# Patient Record
Sex: FEMALE | Race: WHITE | Employment: FULL TIME | URBAN - METROPOLITAN AREA
[De-identification: names, ages, dates, MRNs, and addresses within clinical notes are randomized per-mention and may not be internally consistent; named-entity substitution may affect disease eponyms.]

---

## 2017-01-10 ENCOUNTER — OFFICE VISIT (OUTPATIENT)
Dept: FAMILY MEDICINE CLINIC | Age: 32
End: 2017-01-10

## 2017-01-10 VITALS
HEART RATE: 70 BPM | OXYGEN SATURATION: 100 % | DIASTOLIC BLOOD PRESSURE: 60 MMHG | SYSTOLIC BLOOD PRESSURE: 100 MMHG | TEMPERATURE: 97.6 F | WEIGHT: 128.2 LBS | BODY MASS INDEX: 24.22 KG/M2

## 2017-01-10 DIAGNOSIS — M79.641 PAIN OF RIGHT HAND: Primary | ICD-10-CM

## 2017-01-10 PROCEDURE — 99213 OFFICE O/P EST LOW 20 MIN: CPT | Performed by: FAMILY MEDICINE

## 2017-01-10 RX ORDER — MELOXICAM 15 MG/1
15 TABLET ORAL DAILY
Qty: 30 TABLET | Refills: 3 | Status: SHIPPED | OUTPATIENT
Start: 2017-01-10 | End: 2017-05-22

## 2017-02-20 ENCOUNTER — NURSE ONLY (OUTPATIENT)
Dept: FAMILY MEDICINE CLINIC | Age: 32
End: 2017-02-20

## 2017-02-20 ENCOUNTER — TELEPHONE (OUTPATIENT)
Dept: FAMILY MEDICINE CLINIC | Age: 32
End: 2017-02-20

## 2017-02-20 DIAGNOSIS — R39.15 URGENCY OF URINATION: Primary | ICD-10-CM

## 2017-02-20 DIAGNOSIS — N39.0 URINARY TRACT INFECTION WITHOUT HEMATURIA, SITE UNSPECIFIED: Primary | ICD-10-CM

## 2017-02-20 LAB
BILIRUBIN, POC: ABNORMAL
BLOOD URINE, POC: ABNORMAL
CLARITY, POC: CLEAR
COLOR, POC: ABNORMAL
GLUCOSE URINE, POC: ABNORMAL
KETONES, POC: ABNORMAL
LEUKOCYTE EST, POC: ABNORMAL
NITRITE, POC: POSITIVE
PH, POC: 6
PROTEIN, POC: ABNORMAL
SPECIFIC GRAVITY, POC: 1.02
UROBILINOGEN, POC: 0.2

## 2017-02-20 PROCEDURE — 81002 URINALYSIS NONAUTO W/O SCOPE: CPT | Performed by: FAMILY MEDICINE

## 2017-02-20 PROCEDURE — 81003 URINALYSIS AUTO W/O SCOPE: CPT | Performed by: FAMILY MEDICINE

## 2017-02-20 RX ORDER — NITROFURANTOIN 25; 75 MG/1; MG/1
100 CAPSULE ORAL 2 TIMES DAILY
Qty: 20 CAPSULE | Refills: 0 | Status: SHIPPED | OUTPATIENT
Start: 2017-02-20 | End: 2017-03-02

## 2017-02-22 LAB
ORGANISM: ABNORMAL
URINE CULTURE, ROUTINE: ABNORMAL

## 2017-03-20 ENCOUNTER — OFFICE VISIT (OUTPATIENT)
Dept: FAMILY MEDICINE CLINIC | Age: 32
End: 2017-03-20

## 2017-03-20 VITALS
DIASTOLIC BLOOD PRESSURE: 64 MMHG | HEIGHT: 62 IN | WEIGHT: 131 LBS | BODY MASS INDEX: 24.11 KG/M2 | SYSTOLIC BLOOD PRESSURE: 100 MMHG

## 2017-03-20 DIAGNOSIS — Z00.00 REGULAR CHECK-UP: Primary | ICD-10-CM

## 2017-03-20 LAB
ANION GAP SERPL CALCULATED.3IONS-SCNC: 13 MMOL/L (ref 3–16)
BUN BLDV-MCNC: 10 MG/DL (ref 7–20)
CALCIUM SERPL-MCNC: 9.3 MG/DL (ref 8.3–10.6)
CHLORIDE BLD-SCNC: 102 MMOL/L (ref 99–110)
CHOLESTEROL, TOTAL: 180 MG/DL (ref 0–199)
CO2: 25 MMOL/L (ref 21–32)
CREAT SERPL-MCNC: 0.6 MG/DL (ref 0.6–1.1)
GFR AFRICAN AMERICAN: >60
GFR NON-AFRICAN AMERICAN: >60
GLUCOSE BLD-MCNC: 86 MG/DL (ref 70–99)
HDLC SERPL-MCNC: 65 MG/DL (ref 40–60)
LDL CHOLESTEROL CALCULATED: 103 MG/DL
POTASSIUM SERPL-SCNC: 4.9 MMOL/L (ref 3.5–5.1)
SODIUM BLD-SCNC: 140 MMOL/L (ref 136–145)
TRIGL SERPL-MCNC: 58 MG/DL (ref 0–150)
VLDLC SERPL CALC-MCNC: 12 MG/DL

## 2017-03-20 PROCEDURE — 36415 COLL VENOUS BLD VENIPUNCTURE: CPT | Performed by: FAMILY MEDICINE

## 2017-03-20 PROCEDURE — 99395 PREV VISIT EST AGE 18-39: CPT | Performed by: FAMILY MEDICINE

## 2017-03-25 ENCOUNTER — OFFICE VISIT (OUTPATIENT)
Dept: FAMILY MEDICINE CLINIC | Age: 32
End: 2017-03-25

## 2017-03-25 VITALS — HEART RATE: 61 BPM | OXYGEN SATURATION: 100 % | SYSTOLIC BLOOD PRESSURE: 124 MMHG | DIASTOLIC BLOOD PRESSURE: 70 MMHG

## 2017-03-25 DIAGNOSIS — I95.1 ORTHOSTATIC HYPOTENSION: ICD-10-CM

## 2017-03-25 DIAGNOSIS — M54.50 CHRONIC BILATERAL LOW BACK PAIN WITHOUT SCIATICA: Primary | ICD-10-CM

## 2017-03-25 DIAGNOSIS — G89.29 CHRONIC BILATERAL LOW BACK PAIN WITHOUT SCIATICA: Primary | ICD-10-CM

## 2017-03-25 DIAGNOSIS — L72.9 CYST OF SKIN: ICD-10-CM

## 2017-03-25 PROCEDURE — 99213 OFFICE O/P EST LOW 20 MIN: CPT | Performed by: FAMILY MEDICINE

## 2017-04-07 ENCOUNTER — TELEPHONE (OUTPATIENT)
Dept: FAMILY MEDICINE CLINIC | Age: 32
End: 2017-04-07

## 2017-04-07 DIAGNOSIS — R53.83 FATIGUE, UNSPECIFIED TYPE: Primary | ICD-10-CM

## 2017-04-07 DIAGNOSIS — I95.1 ORTHOSTATIC HYPOTENSION: ICD-10-CM

## 2017-04-07 DIAGNOSIS — N92.6 IRREGULAR MENSES: ICD-10-CM

## 2017-04-07 DIAGNOSIS — R42 LIGHTHEADEDNESS: ICD-10-CM

## 2017-04-10 ENCOUNTER — NURSE ONLY (OUTPATIENT)
Dept: FAMILY MEDICINE CLINIC | Age: 32
End: 2017-04-10

## 2017-04-10 ENCOUNTER — TELEPHONE (OUTPATIENT)
Dept: FAMILY MEDICINE CLINIC | Age: 32
End: 2017-04-10

## 2017-04-10 DIAGNOSIS — N92.6 IRREGULAR MENSES: ICD-10-CM

## 2017-04-10 DIAGNOSIS — R42 LIGHTHEADEDNESS: ICD-10-CM

## 2017-04-10 DIAGNOSIS — I95.1 ORTHOSTATIC HYPOTENSION: ICD-10-CM

## 2017-04-10 DIAGNOSIS — R53.83 FATIGUE, UNSPECIFIED TYPE: ICD-10-CM

## 2017-04-10 LAB
ALT SERPL-CCNC: 60 U/L (ref 10–40)
ANION GAP SERPL CALCULATED.3IONS-SCNC: 16 MMOL/L (ref 3–16)
AST SERPL-CCNC: 38 U/L (ref 15–37)
BASOPHILS ABSOLUTE: 0.1 K/UL (ref 0–0.2)
BASOPHILS RELATIVE PERCENT: 1 %
BUN BLDV-MCNC: 10 MG/DL (ref 7–20)
CALCIUM SERPL-MCNC: 9 MG/DL (ref 8.3–10.6)
CHLORIDE BLD-SCNC: 103 MMOL/L (ref 99–110)
CO2: 23 MMOL/L (ref 21–32)
CREAT SERPL-MCNC: 0.6 MG/DL (ref 0.6–1.1)
EOSINOPHILS ABSOLUTE: 0.6 K/UL (ref 0–0.6)
EOSINOPHILS RELATIVE PERCENT: 9.7 %
FOLLICLE STIMULATING HORMONE: 1.7 MIU/ML
GFR AFRICAN AMERICAN: >60
GFR NON-AFRICAN AMERICAN: >60
GLUCOSE BLD-MCNC: 77 MG/DL (ref 70–99)
HCT VFR BLD CALC: 38.1 % (ref 36–48)
HEMOGLOBIN: 12.5 G/DL (ref 12–16)
LUTEINIZING HORMONE: 2.6 MIU/ML
LYMPHOCYTES ABSOLUTE: 2 K/UL (ref 1–5.1)
LYMPHOCYTES RELATIVE PERCENT: 33.3 %
MCH RBC QN AUTO: 29.7 PG (ref 26–34)
MCHC RBC AUTO-ENTMCNC: 32.8 G/DL (ref 31–36)
MCV RBC AUTO: 90.6 FL (ref 80–100)
MONOCYTES ABSOLUTE: 0.4 K/UL (ref 0–1.3)
MONOCYTES RELATIVE PERCENT: 6.8 %
NEUTROPHILS ABSOLUTE: 2.9 K/UL (ref 1.7–7.7)
NEUTROPHILS RELATIVE PERCENT: 49.2 %
PDW BLD-RTO: 12.5 % (ref 12.4–15.4)
PLATELET # BLD: 241 K/UL (ref 135–450)
PMV BLD AUTO: 9.6 FL (ref 5–10.5)
POTASSIUM SERPL-SCNC: 4.2 MMOL/L (ref 3.5–5.1)
PROGESTERONE LEVEL: 6.02 NG/ML
PROLACTIN: 16.9 NG/ML
RBC # BLD: 4.21 M/UL (ref 4–5.2)
SODIUM BLD-SCNC: 142 MMOL/L (ref 136–145)
TSH REFLEX: 1.48 UIU/ML (ref 0.27–4.2)
VITAMIN B-12: 432 PG/ML (ref 211–911)
WBC # BLD: 6 K/UL (ref 4–11)

## 2017-04-10 PROCEDURE — 36415 COLL VENOUS BLD VENIPUNCTURE: CPT | Performed by: FAMILY MEDICINE

## 2017-04-13 LAB
DHEA: 0.98 NG/ML (ref 1.33–7.78)
ESTRADIOL LEVEL: 132 PG/ML
ESTROGEN TOTAL: 180.5 PG/ML
ESTRONE: 48.5 PG/ML
SEX HORMONE BINDING GLOBULIN: 62 NMOL/L (ref 30–135)
TESTOSTERONE FREE: 2.1 PG/ML (ref 1.3–9.2)
TESTOSTERONE, FEMALES/CHILDREN: 19 NG/DL (ref 9–55)

## 2017-05-18 ENCOUNTER — NURSE ONLY (OUTPATIENT)
Dept: FAMILY MEDICINE CLINIC | Age: 32
End: 2017-05-18

## 2017-05-18 DIAGNOSIS — R74.01 ELEVATED ALT MEASUREMENT: Primary | ICD-10-CM

## 2017-05-18 LAB
ALBUMIN SERPL-MCNC: 4.2 G/DL (ref 3.4–5)
ALP BLD-CCNC: 180 U/L (ref 40–129)
ALT SERPL-CCNC: 85 U/L (ref 10–40)
AST SERPL-CCNC: 45 U/L (ref 15–37)
BILIRUB SERPL-MCNC: 0.3 MG/DL (ref 0–1)
BILIRUBIN DIRECT: <0.2 MG/DL (ref 0–0.3)
BILIRUBIN, INDIRECT: ABNORMAL MG/DL (ref 0–1)
TOTAL PROTEIN: 7.1 G/DL (ref 6.4–8.2)

## 2017-05-18 PROCEDURE — 36415 COLL VENOUS BLD VENIPUNCTURE: CPT | Performed by: FAMILY MEDICINE

## 2017-05-22 ENCOUNTER — OFFICE VISIT (OUTPATIENT)
Dept: FAMILY MEDICINE CLINIC | Age: 32
End: 2017-05-22

## 2017-05-22 VITALS
OXYGEN SATURATION: 99 % | SYSTOLIC BLOOD PRESSURE: 96 MMHG | BODY MASS INDEX: 24.34 KG/M2 | WEIGHT: 132 LBS | TEMPERATURE: 97.5 F | DIASTOLIC BLOOD PRESSURE: 66 MMHG | HEART RATE: 63 BPM

## 2017-05-22 DIAGNOSIS — R79.89 ELEVATED LIVER FUNCTION TESTS: ICD-10-CM

## 2017-05-22 DIAGNOSIS — R11.0 NAUSEA: Primary | ICD-10-CM

## 2017-05-22 PROCEDURE — 99213 OFFICE O/P EST LOW 20 MIN: CPT | Performed by: FAMILY MEDICINE

## 2017-05-26 ENCOUNTER — HOSPITAL ENCOUNTER (OUTPATIENT)
Dept: ULTRASOUND IMAGING | Age: 32
Discharge: HOME | End: 2017-05-26
Admitting: FAMILY MEDICINE

## 2017-05-26 DIAGNOSIS — K82.8 GALLBLADDER SLUDGE: Primary | ICD-10-CM

## 2017-05-26 DIAGNOSIS — R79.89 ELEVATED LIVER FUNCTION TESTS: ICD-10-CM

## 2017-05-26 DIAGNOSIS — R11.0 NAUSEA: ICD-10-CM

## 2017-05-31 ENCOUNTER — INITIAL CONSULT (OUTPATIENT)
Dept: SURGERY | Age: 32
End: 2017-05-31

## 2017-05-31 VITALS
DIASTOLIC BLOOD PRESSURE: 60 MMHG | BODY MASS INDEX: 24.29 KG/M2 | HEIGHT: 62 IN | WEIGHT: 132 LBS | SYSTOLIC BLOOD PRESSURE: 110 MMHG

## 2017-05-31 DIAGNOSIS — R79.89 ELEVATED LFTS: ICD-10-CM

## 2017-05-31 DIAGNOSIS — K81.1 CHRONIC CHOLECYSTITIS: Primary | ICD-10-CM

## 2017-05-31 PROCEDURE — 99243 OFF/OP CNSLTJ NEW/EST LOW 30: CPT | Performed by: SURGERY

## 2017-05-31 RX ORDER — M-VIT,TX,IRON,MINS/CALC/FOLIC 27MG-0.4MG
1 TABLET ORAL DAILY
COMMUNITY
End: 2018-03-26

## 2017-06-28 ENCOUNTER — HOSPITAL ENCOUNTER (OUTPATIENT)
Dept: SURGERY | Age: 32
Discharge: HOME | End: 2017-06-28
Attending: SURGERY | Admitting: SURGERY

## 2017-06-28 VITALS
BODY MASS INDEX: 24.29 KG/M2 | TEMPERATURE: 97.7 F | SYSTOLIC BLOOD PRESSURE: 103 MMHG | HEART RATE: 69 BPM | RESPIRATION RATE: 16 BRPM | WEIGHT: 132 LBS | OXYGEN SATURATION: 100 % | HEIGHT: 62 IN | DIASTOLIC BLOOD PRESSURE: 72 MMHG

## 2017-06-28 DIAGNOSIS — K81.1 CHRONIC CHOLECYSTITIS: ICD-10-CM

## 2017-06-28 LAB
ALBUMIN SERPL-MCNC: 4.1 G/DL (ref 3.4–5)
ALP BLD-CCNC: 178 U/L (ref 40–129)
ALT SERPL-CCNC: 52 U/L (ref 10–40)
AST SERPL-CCNC: 36 U/L (ref 15–37)
BILIRUB SERPL-MCNC: 0.5 MG/DL (ref 0–1)
BILIRUBIN DIRECT: <0.2 MG/DL (ref 0–0.3)
BILIRUBIN, INDIRECT: ABNORMAL MG/DL (ref 0–1)
TOTAL PROTEIN: 7.5 G/DL (ref 6.4–8.2)

## 2017-06-28 PROCEDURE — 47563 LAPARO CHOLECYSTECTOMY/GRAPH: CPT | Performed by: SURGERY

## 2017-06-28 PROCEDURE — 47379 UNLISTED LAPS PX LIVER: CPT | Performed by: SURGERY

## 2017-06-28 RX ORDER — HEPARIN SODIUM 5000 [USP'U]/ML
5000 INJECTION, SOLUTION INTRAVENOUS; SUBCUTANEOUS EVERY 8 HOURS SCHEDULED
Status: DISCONTINUED | OUTPATIENT
Start: 2017-06-28 | End: 2017-06-29 | Stop reason: HOSPADM

## 2017-06-28 RX ORDER — OXYCODONE HYDROCHLORIDE AND ACETAMINOPHEN 5; 325 MG/1; MG/1
2 TABLET ORAL PRN
Status: COMPLETED | OUTPATIENT
Start: 2017-06-28 | End: 2017-06-28

## 2017-06-28 RX ORDER — ONDANSETRON 2 MG/ML
4 INJECTION INTRAMUSCULAR; INTRAVENOUS
Status: COMPLETED | OUTPATIENT
Start: 2017-06-28 | End: 2017-06-28

## 2017-06-28 RX ORDER — LABETALOL HYDROCHLORIDE 5 MG/ML
5 INJECTION, SOLUTION INTRAVENOUS EVERY 10 MIN PRN
Status: DISCONTINUED | OUTPATIENT
Start: 2017-06-28 | End: 2017-06-29 | Stop reason: HOSPADM

## 2017-06-28 RX ORDER — LIDOCAINE HYDROCHLORIDE 10 MG/ML
1 INJECTION, SOLUTION EPIDURAL; INFILTRATION; INTRACAUDAL; PERINEURAL
Status: COMPLETED | OUTPATIENT
Start: 2017-06-28 | End: 2017-06-28

## 2017-06-28 RX ORDER — OXYCODONE HYDROCHLORIDE 5 MG/1
TABLET ORAL
Qty: 40 TABLET | Refills: 0 | Status: SHIPPED | OUTPATIENT
Start: 2017-06-28 | End: 2017-07-12

## 2017-06-28 RX ORDER — MORPHINE SULFATE 4 MG/ML
1 INJECTION, SOLUTION INTRAMUSCULAR; INTRAVENOUS EVERY 5 MIN PRN
Status: DISCONTINUED | OUTPATIENT
Start: 2017-06-28 | End: 2017-06-29 | Stop reason: HOSPADM

## 2017-06-28 RX ORDER — SODIUM CHLORIDE 0.9 % (FLUSH) 0.9 %
10 SYRINGE (ML) INJECTION EVERY 12 HOURS SCHEDULED
Status: DISCONTINUED | OUTPATIENT
Start: 2017-06-28 | End: 2017-06-29 | Stop reason: HOSPADM

## 2017-06-28 RX ORDER — MEPERIDINE HYDROCHLORIDE 25 MG/ML
12.5 INJECTION INTRAMUSCULAR; INTRAVENOUS; SUBCUTANEOUS EVERY 5 MIN PRN
Status: DISCONTINUED | OUTPATIENT
Start: 2017-06-28 | End: 2017-06-29 | Stop reason: HOSPADM

## 2017-06-28 RX ORDER — PROMETHAZINE HYDROCHLORIDE 25 MG/ML
6.25 INJECTION, SOLUTION INTRAMUSCULAR; INTRAVENOUS
Status: ACTIVE | OUTPATIENT
Start: 2017-06-28 | End: 2017-06-28

## 2017-06-28 RX ORDER — HYDROMORPHONE HCL 110MG/55ML
0.25 PATIENT CONTROLLED ANALGESIA SYRINGE INTRAVENOUS EVERY 5 MIN PRN
Status: DISCONTINUED | OUTPATIENT
Start: 2017-06-28 | End: 2017-06-29 | Stop reason: HOSPADM

## 2017-06-28 RX ORDER — SODIUM CHLORIDE, SODIUM LACTATE, POTASSIUM CHLORIDE, CALCIUM CHLORIDE 600; 310; 30; 20 MG/100ML; MG/100ML; MG/100ML; MG/100ML
INJECTION, SOLUTION INTRAVENOUS CONTINUOUS
Status: DISCONTINUED | OUTPATIENT
Start: 2017-06-28 | End: 2017-06-29 | Stop reason: HOSPADM

## 2017-06-28 RX ORDER — HYDROMORPHONE HCL 110MG/55ML
0.5 PATIENT CONTROLLED ANALGESIA SYRINGE INTRAVENOUS EVERY 5 MIN PRN
Status: DISCONTINUED | OUTPATIENT
Start: 2017-06-28 | End: 2017-06-29 | Stop reason: HOSPADM

## 2017-06-28 RX ORDER — MORPHINE SULFATE 4 MG/ML
2 INJECTION, SOLUTION INTRAMUSCULAR; INTRAVENOUS EVERY 5 MIN PRN
Status: DISCONTINUED | OUTPATIENT
Start: 2017-06-28 | End: 2017-06-29 | Stop reason: HOSPADM

## 2017-06-28 RX ORDER — HYDRALAZINE HYDROCHLORIDE 20 MG/ML
5 INJECTION INTRAMUSCULAR; INTRAVENOUS EVERY 10 MIN PRN
Status: DISCONTINUED | OUTPATIENT
Start: 2017-06-28 | End: 2017-06-29 | Stop reason: HOSPADM

## 2017-06-28 RX ORDER — OXYCODONE HYDROCHLORIDE AND ACETAMINOPHEN 5; 325 MG/1; MG/1
1 TABLET ORAL PRN
Status: COMPLETED | OUTPATIENT
Start: 2017-06-28 | End: 2017-06-28

## 2017-06-28 RX ORDER — SODIUM CHLORIDE 0.9 % (FLUSH) 0.9 %
10 SYRINGE (ML) INJECTION PRN
Status: DISCONTINUED | OUTPATIENT
Start: 2017-06-28 | End: 2017-06-29 | Stop reason: HOSPADM

## 2017-06-28 RX ORDER — DIPHENHYDRAMINE HYDROCHLORIDE 50 MG/ML
12.5 INJECTION INTRAMUSCULAR; INTRAVENOUS
Status: ACTIVE | OUTPATIENT
Start: 2017-06-28 | End: 2017-06-28

## 2017-06-28 RX ADMIN — Medication 0.5 MG: at 09:57

## 2017-06-28 RX ADMIN — HEPARIN SODIUM 5000 UNITS: 5000 INJECTION, SOLUTION INTRAVENOUS; SUBCUTANEOUS at 07:19

## 2017-06-28 RX ADMIN — ONDANSETRON 4 MG: 2 INJECTION INTRAMUSCULAR; INTRAVENOUS at 10:47

## 2017-06-28 RX ADMIN — Medication 0.5 MG: at 09:41

## 2017-06-28 RX ADMIN — Medication 0.5 MG: at 09:46

## 2017-06-28 RX ADMIN — LIDOCAINE HYDROCHLORIDE 1 ML: 10 INJECTION, SOLUTION EPIDURAL; INFILTRATION; INTRACAUDAL; PERINEURAL at 07:19

## 2017-06-28 RX ADMIN — SODIUM CHLORIDE, SODIUM LACTATE, POTASSIUM CHLORIDE, CALCIUM CHLORIDE: 600; 310; 30; 20 INJECTION, SOLUTION INTRAVENOUS at 07:19

## 2017-06-28 RX ADMIN — OXYCODONE HYDROCHLORIDE AND ACETAMINOPHEN 1 TABLET: 5; 325 TABLET ORAL at 10:17

## 2017-06-28 ASSESSMENT — PAIN SCALES - GENERAL
PAINLEVEL_OUTOF10: 3
PAINLEVEL_OUTOF10: 3
PAINLEVEL_OUTOF10: 7
PAINLEVEL_OUTOF10: 3
PAINLEVEL_OUTOF10: 3
PAINLEVEL_OUTOF10: 7
PAINLEVEL_OUTOF10: 7

## 2017-06-28 ASSESSMENT — PAIN DESCRIPTION - DESCRIPTORS: DESCRIPTORS: CRAMPING;SORE

## 2017-06-28 ASSESSMENT — PAIN DESCRIPTION - LOCATION: LOCATION: ABDOMEN

## 2017-06-28 ASSESSMENT — PAIN DESCRIPTION - PAIN TYPE: TYPE: SURGICAL PAIN

## 2017-06-28 ASSESSMENT — PAIN - FUNCTIONAL ASSESSMENT: PAIN_FUNCTIONAL_ASSESSMENT: 0-10

## 2017-06-28 ASSESSMENT — PAIN DESCRIPTION - ORIENTATION: ORIENTATION: RIGHT;MID;UPPER

## 2017-07-05 ENCOUNTER — TELEPHONE (OUTPATIENT)
Dept: FAMILY MEDICINE CLINIC | Age: 32
End: 2017-07-05

## 2017-07-08 ENCOUNTER — TELEPHONE (OUTPATIENT)
Dept: FAMILY MEDICINE CLINIC | Age: 32
End: 2017-07-08

## 2017-07-08 RX ORDER — CEPHALEXIN 500 MG/1
500 CAPSULE ORAL 3 TIMES DAILY
Qty: 30 CAPSULE | Refills: 0 | Status: SHIPPED | OUTPATIENT
Start: 2017-07-08 | End: 2017-07-18

## 2017-07-12 ENCOUNTER — OFFICE VISIT (OUTPATIENT)
Dept: SURGERY | Age: 32
End: 2017-07-12

## 2017-07-12 VITALS
WEIGHT: 130 LBS | HEIGHT: 62 IN | BODY MASS INDEX: 23.92 KG/M2 | DIASTOLIC BLOOD PRESSURE: 60 MMHG | SYSTOLIC BLOOD PRESSURE: 100 MMHG

## 2017-07-12 DIAGNOSIS — Z09 SURGERY FOLLOW-UP EXAMINATION: Primary | ICD-10-CM

## 2017-07-12 PROCEDURE — 99024 POSTOP FOLLOW-UP VISIT: CPT | Performed by: SURGERY

## 2017-07-25 ENCOUNTER — TELEPHONE (OUTPATIENT)
Dept: FAMILY MEDICINE CLINIC | Age: 32
End: 2017-07-25

## 2017-07-25 DIAGNOSIS — R79.89 ELEVATED LFTS: Primary | ICD-10-CM

## 2017-07-27 ENCOUNTER — NURSE ONLY (OUTPATIENT)
Dept: FAMILY MEDICINE CLINIC | Age: 32
End: 2017-07-27

## 2017-07-27 DIAGNOSIS — R79.89 ELEVATED LFTS: ICD-10-CM

## 2017-07-27 LAB
ALBUMIN SERPL-MCNC: 4.1 G/DL (ref 3.4–5)
ALP BLD-CCNC: 186 U/L (ref 40–129)
ALT SERPL-CCNC: 74 U/L (ref 10–40)
AST SERPL-CCNC: 41 U/L (ref 15–37)
BILIRUB SERPL-MCNC: 0.6 MG/DL (ref 0–1)
BILIRUBIN DIRECT: <0.2 MG/DL (ref 0–0.3)
BILIRUBIN, INDIRECT: ABNORMAL MG/DL (ref 0–1)
TOTAL PROTEIN: 7.1 G/DL (ref 6.4–8.2)

## 2017-07-27 PROCEDURE — 36415 COLL VENOUS BLD VENIPUNCTURE: CPT | Performed by: FAMILY MEDICINE

## 2017-07-28 ENCOUNTER — NURSE ONLY (OUTPATIENT)
Dept: FAMILY MEDICINE CLINIC | Age: 32
End: 2017-07-28

## 2017-07-28 DIAGNOSIS — R79.89 ELEVATED LFTS: Primary | ICD-10-CM

## 2017-07-28 LAB
ALBUMIN SERPL-MCNC: 4.3 G/DL (ref 3.4–5)
ALP BLD-CCNC: 187 U/L (ref 40–129)
ALT SERPL-CCNC: 65 U/L (ref 10–40)
AST SERPL-CCNC: 34 U/L (ref 15–37)
BILIRUB SERPL-MCNC: 0.5 MG/DL (ref 0–1)
BILIRUBIN DIRECT: <0.2 MG/DL (ref 0–0.3)
BILIRUBIN, INDIRECT: ABNORMAL MG/DL (ref 0–1)
IRON SATURATION: 43 % (ref 15–50)
IRON: 129 UG/DL (ref 37–145)
SEDIMENTATION RATE, ERYTHROCYTE: 23 MM/HR (ref 0–20)
TOTAL CK: 43 U/L (ref 26–192)
TOTAL IRON BINDING CAPACITY: 298 UG/DL (ref 260–445)

## 2017-07-28 PROCEDURE — 36415 COLL VENOUS BLD VENIPUNCTURE: CPT | Performed by: FAMILY MEDICINE

## 2017-07-29 LAB
HBV SURFACE AB TITR SER: >1000 MUL/ML
HEPATITIS B SURFACE ANTIGEN INTERPRETATION: NORMAL
HEPATITIS C ANTIBODY INTERPRETATION: NORMAL
T3 TOTAL: 1.02 NG/ML (ref 0.8–2)
T4 FREE: 1.2 NG/DL (ref 0.9–1.8)
TSH SERPL DL<=0.05 MIU/L-ACNC: 1.5 UIU/ML (ref 0.27–4.2)

## 2017-07-30 LAB
ENDOMYSIAL IGA ANTIBODY TITER: NORMAL
HEPATITIS B CORE TOTAL ANTIBODY: NEGATIVE

## 2017-07-31 LAB
ALBUMIN SERPL-MCNC: 3.6 G/DL (ref 3.1–4.9)
ALPHA-1-GLOBULIN: 0.2 G/DL (ref 0.2–0.4)
ALPHA-2-GLOBULIN: 0.7 G/DL (ref 0.4–1.1)
BETA GLOBULIN: 1.1 G/DL (ref 0.9–1.6)
GAMMA GLOBULIN: 1.7 G/DL (ref 0.6–1.8)
SPE/IFE INTERPRETATION: NORMAL
TOTAL PROTEIN: 7.4 G/DL (ref 6.4–8.2)

## 2017-08-01 LAB
ALPHA-1 ANTITRYPSIN: 109 MG/DL (ref 90–200)
ANA INTERPRETATION: ABNORMAL
ANA TITER: ABNORMAL
ANTI-NUCLEAR ANTIBODY (ANA): POSITIVE
CERULOPLASMIN: 31 MG/DL (ref 16–45)

## 2017-08-15 ENCOUNTER — TELEPHONE (OUTPATIENT)
Dept: FAMILY MEDICINE CLINIC | Age: 32
End: 2017-08-15

## 2017-08-15 RX ORDER — OMEPRAZOLE 40 MG/1
40 CAPSULE, DELAYED RELEASE ORAL DAILY
Qty: 30 CAPSULE | Refills: 3 | Status: SHIPPED | OUTPATIENT
Start: 2017-08-15 | End: 2017-10-24

## 2017-09-27 ENCOUNTER — NURSE ONLY (OUTPATIENT)
Dept: FAMILY MEDICINE CLINIC | Age: 32
End: 2017-09-27

## 2017-09-27 DIAGNOSIS — R30.9 PAIN WITH URINATION: Primary | ICD-10-CM

## 2017-09-27 LAB
BILIRUBIN, POC: ABNORMAL
BLOOD URINE, POC: ABNORMAL
CLARITY, POC: CLEAR
COLOR, POC: ABNORMAL
GLUCOSE URINE, POC: ABNORMAL
KETONES, POC: ABNORMAL
LEUKOCYTE EST, POC: ABNORMAL
NITRITE, POC: POSITIVE
PH, POC: 6
PROTEIN, POC: ABNORMAL
SPECIFIC GRAVITY, POC: 1.02
UROBILINOGEN, POC: 1

## 2017-09-27 PROCEDURE — 81002 URINALYSIS NONAUTO W/O SCOPE: CPT | Performed by: FAMILY MEDICINE

## 2017-09-27 RX ORDER — NITROFURANTOIN 25; 75 MG/1; MG/1
100 CAPSULE ORAL 2 TIMES DAILY
Qty: 14 CAPSULE | Refills: 0 | Status: SHIPPED | OUTPATIENT
Start: 2017-09-27 | End: 2017-10-04

## 2017-09-29 LAB
ORGANISM: ABNORMAL
URINE CULTURE, ROUTINE: ABNORMAL
URINE CULTURE, ROUTINE: ABNORMAL

## 2017-10-05 ENCOUNTER — OFFICE VISIT (OUTPATIENT)
Dept: FAMILY MEDICINE CLINIC | Age: 32
End: 2017-10-05

## 2017-10-05 VITALS
HEART RATE: 64 BPM | DIASTOLIC BLOOD PRESSURE: 60 MMHG | WEIGHT: 129.6 LBS | BODY MASS INDEX: 23.85 KG/M2 | OXYGEN SATURATION: 100 % | SYSTOLIC BLOOD PRESSURE: 110 MMHG

## 2017-10-05 DIAGNOSIS — M79.642 PAIN IN BOTH HANDS: Primary | ICD-10-CM

## 2017-10-05 DIAGNOSIS — M79.641 PAIN IN BOTH HANDS: Primary | ICD-10-CM

## 2017-10-05 PROBLEM — Z90.711 HISTORY OF PARTIAL HYSTERECTOMY: Status: ACTIVE | Noted: 2017-10-05

## 2017-10-05 PROBLEM — Z90.722 S/P BSO (STATUS POST BILATERAL SALPINGO-OOPHORECTOMY): Status: RESOLVED | Noted: 2017-10-05 | Resolved: 2017-10-05

## 2017-10-05 PROBLEM — Z90.722 S/P BSO (STATUS POST BILATERAL SALPINGO-OOPHORECTOMY): Status: ACTIVE | Noted: 2017-10-05

## 2017-10-05 PROBLEM — Z90.49 S/P CHOLECYSTECTOMY: Status: ACTIVE | Noted: 2017-10-05

## 2017-10-05 PROCEDURE — 99213 OFFICE O/P EST LOW 20 MIN: CPT | Performed by: FAMILY MEDICINE

## 2017-10-05 RX ORDER — MELOXICAM 15 MG/1
15 TABLET ORAL DAILY
Qty: 30 TABLET | Refills: 3 | Status: SHIPPED | OUTPATIENT
Start: 2017-10-05 | End: 2018-03-26

## 2017-10-05 NOTE — MR AVS SNAPSHOT
After Visit Summary             Sophy Howell   10/5/2017 10:40 AM   Office Visit    Description:  Female : 1985   Provider:  Godfrey Galvin MD   Department:  [facility] 10 and Future Appointments         Below is a list of your follow-up and future appointments. This may not be a complete list as you may have made appointments directly with providers that we are not aware of or your providers may have made some for you. Please call your providers to confirm appointments. It is important to keep your appointments. Please bring your current insurance card, photo ID, co-pay, and all medication bottles to your appointment. If self-pay, payment is expected at the time of service. Information from Your Visit        Department     Name Address Phone Fax    [address] [address] [phone]      You Were Seen for:         Comments    Pain in both hands   [[id number]]         Vital Signs     Blood Pressure Pulse Weight Last Menstrual Period Oxygen Saturation Body Mass Index    110/60 64 129 lb 9.6 oz (58.8 kg) 2015 100% 23.85 kg/m2    Smoking Status                   Never Smoker              Today's Medication Changes          These changes are accurate as of: 10/5/17  1:11 PM.  If you have any questions, ask your nurse or doctor. START taking these medications           meloxicam 15 MG tablet   Commonly known as:  MOBIC   Instructions: Take 1 tablet by mouth daily   Quantity:  30 tablet   Refills:  3   Started by:   Godfrey Galvin MD            Where to Get Your Medications      These medications were sent to [place] [id number] [facility] [address] [place], [place] - [address] [facility] Arjun Person [phone] Harmony Krishnane [phone]  [address] [facility] [address]     Phone:  [phone]     meloxicam 15 MG tablet               Your Current Medications Are

## 2017-10-05 NOTE — PROGRESS NOTES
Subjective:  Jessica Garcia is here to discuss the following issues. She has pain in both hands and wrists. She uses her hands and wrists repeatedly throughout the day and evening. No trauma. She has tenderness at the base of both thumbs at the base of her mid palm area and from her lateral forearm down to her fifth digit. No redness or warmth. She has tried no medications. No numbness or tingling. No decreased blood flow no other joint involvement  History   Smoking Status    Never Smoker   Smokeless Tobacco    Never Used   Allergies:     Sporanox [itraconazole]    Objective:  /60  Pulse 64  Wt 129 lb 9.6 oz (58.8 kg)  LMP 05/14/2015  SpO2 100%  BMI 23.85 kg/m2   No acute distress, heart regular rate and rhythm without murmur, lungs clear to auscultation easy effort, abdomen soft nondistended, no clubbing or cyanosis bilateral tenderness at the base of her with positive Finkelstein test.  There is tenderness to the base of her home and along the lower aspect of her forearm bilaterally    Assessment:  1. Pain in both hands            Plan:  Mobic  Activity modification  Ice  Offered ortho referral  Follow-up p.r.n. \"Healthy Family Handout\" provided  Avoid exposure to all tobacco products. Read and consider all information provided by the pharmacy regarding prescribed medications before use  Careful medical compliance  Proper diet and weight management   Otherwise, continue current treatment plan  Call or return if symptoms are not well controlled  Go to ED if severe/significant symptoms occur    All medical conditions for this patient are stable unless otherwise indicated    Syed Cueto MD    This note was transcribed using a voice recognition software system. Proper technique and careful oversight were used to increase transcription accuracy but inadvertent errors may be present.

## 2017-10-24 ENCOUNTER — TELEPHONE (OUTPATIENT)
Dept: FAMILY MEDICINE CLINIC | Age: 32
End: 2017-10-24

## 2017-10-24 NOTE — TELEPHONE ENCOUNTER
Patient started with vomiting and diarrhea this morning and is having a lot of nausea with it. She is also having a lot of abdominal pain and states the pain is going around to her back.  She didn't know if you could give her something to help with this

## 2017-10-24 NOTE — TELEPHONE ENCOUNTER
Patient states it feels almost like heartburn pain. She states got some relief laying on her right side with a pillow into her gallbladder area. But is still having burning pain in her back.

## 2017-10-26 ENCOUNTER — CARE COORDINATION (OUTPATIENT)
Dept: CARE COORDINATION | Age: 32
End: 2017-10-26

## 2017-10-26 NOTE — CARE COORDINATION
Ambulatory Care Coordination ED Follow up Call       Reason for ED Visit: Abdominal/Back Pain, Vomiting & Nausea  Care Management Risk Score: CMRS 5  How are you feeling? :     improved  Patient Reports the following:  none             Contact [facility] regarding any worsening symptoms from above. Did you call your PCP prior to going to the ED? Yes          Post Discharge Status:  What health concerns since you left the Emergency Room? None     Do you have wounds that you are caring for at home? No    Do you have a follow up appt scheduled?  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 None   Are you taking your medications as directed? If not - why? Yes   Can you afford your medications? yes  ADLS:  Do you need assistance of any kind at home? No   What assistance is needed? FU appts/Provider:    No future appointments. Health Maintenance Due   Topic Date Due    Pneumococcal med risk (1 of 1 - PPSV23) 05/27/2002    DTaP/Tdap/Td vaccine (1 - Tdap) 06/11/2016    Flu vaccine (1) 09/01/2017     Patient advised to contact PCP office to have HM items/records faxed to PCP Office directly?   [facility]   [phone]

## 2017-11-20 ENCOUNTER — TELEPHONE (OUTPATIENT)
Dept: FAMILY MEDICINE CLINIC | Age: 32
End: 2017-11-20

## 2017-11-20 RX ORDER — AZITHROMYCIN 250 MG/1
TABLET, FILM COATED ORAL
Qty: 1 PACKET | Refills: 0 | Status: SHIPPED | OUTPATIENT
Start: 2017-11-20 | End: 2017-11-30

## 2017-12-14 ENCOUNTER — TELEPHONE (OUTPATIENT)
Dept: FAMILY MEDICINE CLINIC | Age: 32
End: 2017-12-14

## 2017-12-14 RX ORDER — FLUCONAZOLE 200 MG/1
200 TABLET ORAL DAILY
Qty: 3 TABLET | Refills: 0 | Status: SHIPPED | OUTPATIENT
Start: 2017-12-14 | End: 2017-12-17

## 2017-12-14 NOTE — TELEPHONE ENCOUNTER
Patient is having occasional  itching and burning that has been going on for a few weeks, very uncomfortable when having intercourse. Doesn't really have a discharge or any odor.  Wasn't sure if Diflucan would help or if needed to try something OTC

## 2018-01-31 ENCOUNTER — TELEPHONE (OUTPATIENT)
Dept: FAMILY MEDICINE CLINIC | Age: 33
End: 2018-01-31

## 2018-01-31 DIAGNOSIS — M54.5 ACUTE RIGHT-SIDED LOW BACK PAIN, WITH SCIATICA PRESENCE UNSPECIFIED: Primary | ICD-10-CM

## 2018-03-08 ENCOUNTER — TELEPHONE (OUTPATIENT)
Dept: FAMILY MEDICINE CLINIC | Age: 33
End: 2018-03-08

## 2018-03-08 RX ORDER — LEVOFLOXACIN 500 MG/1
500 TABLET, FILM COATED ORAL DAILY
Qty: 10 TABLET | Refills: 0 | Status: SHIPPED | OUTPATIENT
Start: 2018-03-08 | End: 2018-03-18

## 2018-03-26 ENCOUNTER — OFFICE VISIT (OUTPATIENT)
Dept: FAMILY MEDICINE CLINIC | Age: 33
End: 2018-03-26

## 2018-03-26 DIAGNOSIS — L02.214 ABSCESS OF GROIN: Primary | ICD-10-CM

## 2018-03-26 PROCEDURE — 99213 OFFICE O/P EST LOW 20 MIN: CPT | Performed by: FAMILY MEDICINE

## 2018-03-26 RX ORDER — LEVOFLOXACIN 500 MG/1
500 TABLET, FILM COATED ORAL DAILY
Qty: 20 TABLET | Refills: 0 | Status: SHIPPED | OUTPATIENT
Start: 2018-03-26 | End: 2018-04-15

## 2018-03-26 NOTE — PROGRESS NOTES
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 She has an area of redness swelling and tenderness and drainage in the left groin area. She had taken 7 days of Levaquin and this improved dramatically but did not fully resolve. No red streaking fever headache nausea or vomiting. No urinary or bowel symptoms. Symptoms began about 3 weeks ago. It began draining yesterday while in the shower. History   Smoking Status    Never Smoker   Smokeless Tobacco    Never Used   Allergies:     Sporanox [itraconazole]    Objective:  LMP 05/14/2015    No acute distress, heart regular rate and rhythm without murmur, lungs clear to auscultation easy effort, abdomen soft nondistended, no clubbing or cyanosis a red tender flat area is noted with minimal drainage    Assessment:  1. Abscess of groin            Plan:  Fluid was collected for culture and sensitivity  Levaquin for 20 additional days  Advised on proper treatment including warm baths  Follow-up as scheduled or p.r.n. \"Healthy Family Handout\" provided  Avoid exposure to all tobacco products. Read and consider all information provided by the pharmacy regarding prescribed medications before use  Careful medical compliance  Proper diet and weight management   Otherwise continue current treatment plan  Call or return if symptoms are not well controlled  Go to ED if severe/significant symptoms occur    All medical conditions for this patient are stable unless otherwise indicated    Yani Call MD    This note was transcribed using a voice recognition software system. Proper technique and careful oversight were used to increase transcription accuracy but inadvertent errors may be present.

## 2018-03-28 LAB
GRAM STAIN RESULT: NORMAL
WOUND/ABSCESS: NORMAL

## 2018-04-11 ENCOUNTER — OFFICE VISIT (OUTPATIENT)
Dept: FAMILY MEDICINE CLINIC | Age: 33
End: 2018-04-11

## 2018-04-11 VITALS
HEART RATE: 64 BPM | DIASTOLIC BLOOD PRESSURE: 79 MMHG | WEIGHT: 131.8 LBS | OXYGEN SATURATION: 95 % | BODY MASS INDEX: 24.88 KG/M2 | HEIGHT: 61 IN | SYSTOLIC BLOOD PRESSURE: 112 MMHG

## 2018-04-11 DIAGNOSIS — Z00.00 WELL ADULT EXAM: Primary | ICD-10-CM

## 2018-04-11 PROCEDURE — 99395 PREV VISIT EST AGE 18-39: CPT | Performed by: FAMILY MEDICINE

## 2018-04-13 ENCOUNTER — NURSE ONLY (OUTPATIENT)
Dept: FAMILY MEDICINE CLINIC | Age: 33
End: 2018-04-13

## 2018-04-13 DIAGNOSIS — Z00.00 WELL ADULT EXAM: ICD-10-CM

## 2018-04-13 DIAGNOSIS — Z00.00 WELLNESS EXAMINATION: Primary | ICD-10-CM

## 2018-04-13 LAB
ANION GAP SERPL CALCULATED.3IONS-SCNC: 13 MMOL/L (ref 3–16)
BUN BLDV-MCNC: 15 MG/DL (ref 7–20)
CALCIUM SERPL-MCNC: 9.5 MG/DL (ref 8.3–10.6)
CHLORIDE BLD-SCNC: 102 MMOL/L (ref 99–110)
CHOLESTEROL, TOTAL: 183 MG/DL (ref 0–199)
CO2: 25 MMOL/L (ref 21–32)
CREAT SERPL-MCNC: 0.6 MG/DL (ref 0.6–1.1)
GFR AFRICAN AMERICAN: >60
GFR NON-AFRICAN AMERICAN: >60
GLUCOSE BLD-MCNC: 83 MG/DL (ref 70–99)
HDLC SERPL-MCNC: 67 MG/DL (ref 40–60)
LDL CHOLESTEROL CALCULATED: 102 MG/DL
POTASSIUM SERPL-SCNC: 4.5 MMOL/L (ref 3.5–5.1)
SODIUM BLD-SCNC: 140 MMOL/L (ref 136–145)
TRIGL SERPL-MCNC: 68 MG/DL (ref 0–150)
VLDLC SERPL CALC-MCNC: 14 MG/DL

## 2018-04-13 PROCEDURE — 36415 COLL VENOUS BLD VENIPUNCTURE: CPT | Performed by: FAMILY MEDICINE

## 2018-05-15 RX ORDER — ALBUTEROL SULFATE 90 UG/1
2 AEROSOL, METERED RESPIRATORY (INHALATION) EVERY 4 HOURS PRN
Qty: 1 INHALER | Refills: 3 | Status: SHIPPED | OUTPATIENT
Start: 2018-05-15 | End: 2018-12-31 | Stop reason: ALTCHOICE

## 2018-05-17 ENCOUNTER — TELEPHONE (OUTPATIENT)
Dept: FAMILY MEDICINE CLINIC | Age: 33
End: 2018-05-17

## 2018-05-17 RX ORDER — FLUCONAZOLE 200 MG/1
200 TABLET ORAL DAILY
Qty: 3 TABLET | Refills: 0 | Status: SHIPPED | OUTPATIENT
Start: 2018-05-17 | End: 2018-05-20

## 2018-07-05 ENCOUNTER — TELEPHONE (OUTPATIENT)
Dept: FAMILY MEDICINE CLINIC | Age: 33
End: 2018-07-05

## 2018-07-05 RX ORDER — PREDNISONE 20 MG/1
40 TABLET ORAL DAILY
Qty: 14 TABLET | Refills: 0 | Status: SHIPPED | OUTPATIENT
Start: 2018-07-05 | End: 2018-07-12

## 2018-07-05 NOTE — TELEPHONE ENCOUNTER
Patient has poison ivy on her arm and has spread to her legs.  She leaves in the morning for vacation and is wanting to see if a RX for prednisone could be called in for her

## 2018-07-17 ENCOUNTER — OFFICE VISIT (OUTPATIENT)
Dept: FAMILY MEDICINE CLINIC | Age: 33
End: 2018-07-17

## 2018-07-17 VITALS
OXYGEN SATURATION: 100 % | SYSTOLIC BLOOD PRESSURE: 126 MMHG | DIASTOLIC BLOOD PRESSURE: 82 MMHG | HEART RATE: 65 BPM | BODY MASS INDEX: 25.18 KG/M2 | TEMPERATURE: 97.9 F | WEIGHT: 133.25 LBS

## 2018-07-17 DIAGNOSIS — L30.9 DERMATITIS: Primary | ICD-10-CM

## 2018-07-17 PROCEDURE — 99212 OFFICE O/P EST SF 10 MIN: CPT | Performed by: NURSE PRACTITIONER

## 2018-07-17 RX ORDER — HYDROXYZINE PAMOATE 25 MG/1
25 CAPSULE ORAL 3 TIMES DAILY PRN
Qty: 30 CAPSULE | Refills: 0 | Status: SHIPPED | OUTPATIENT
Start: 2018-07-17 | End: 2018-12-31 | Stop reason: ALTCHOICE

## 2018-07-17 ASSESSMENT — PATIENT HEALTH QUESTIONNAIRE - PHQ9
SUM OF ALL RESPONSES TO PHQ9 QUESTIONS 1 & 2: 0
1. LITTLE INTEREST OR PLEASURE IN DOING THINGS: 0
SUM OF ALL RESPONSES TO PHQ QUESTIONS 1-9: 0
2. FEELING DOWN, DEPRESSED OR HOPELESS: 0

## 2018-07-17 ASSESSMENT — ENCOUNTER SYMPTOMS
TROUBLE SWALLOWING: 0
SHORTNESS OF BREATH: 0
RHINORRHEA: 0
SORE THROAT: 0
NAUSEA: 0

## 2018-07-17 NOTE — PATIENT INSTRUCTIONS
Please read the healthy family handout that you were given and share it with your family. Please compare this printed medication list with your medications at home to be sure they are the same. If you have any medications that are different please contact us immediately at [phone]. Also review your allergies that we have listed, these may also include medications that you have not been able to tolerate, make sure everything listed is correct. If you have any allergies that are different please contact us immediately at [phone].

## 2018-08-30 ENCOUNTER — TELEPHONE (OUTPATIENT)
Dept: FAMILY MEDICINE CLINIC | Age: 33
End: 2018-08-30

## 2018-08-30 RX ORDER — AZITHROMYCIN 250 MG/1
TABLET, FILM COATED ORAL
Qty: 1 PACKET | Refills: 0 | Status: SHIPPED | OUTPATIENT
Start: 2018-08-30 | End: 2019-04-10 | Stop reason: SDUPTHER

## 2018-10-09 ENCOUNTER — OFFICE VISIT (OUTPATIENT)
Dept: FAMILY MEDICINE CLINIC | Age: 33
End: 2018-10-09
Payer: COMMERCIAL

## 2018-10-09 VITALS
HEART RATE: 65 BPM | BODY MASS INDEX: 25.3 KG/M2 | HEIGHT: 61 IN | WEIGHT: 134 LBS | SYSTOLIC BLOOD PRESSURE: 112 MMHG | OXYGEN SATURATION: 100 % | DIASTOLIC BLOOD PRESSURE: 76 MMHG

## 2018-10-09 DIAGNOSIS — H66.001 ACUTE SUPPURATIVE OTITIS MEDIA OF RIGHT EAR WITHOUT SPONTANEOUS RUPTURE OF TYMPANIC MEMBRANE, RECURRENCE NOT SPECIFIED: Primary | ICD-10-CM

## 2018-10-09 PROCEDURE — 99214 OFFICE O/P EST MOD 30 MIN: CPT | Performed by: NURSE PRACTITIONER

## 2018-10-09 RX ORDER — AMOXICILLIN 500 MG/1
500 CAPSULE ORAL 3 TIMES DAILY
Qty: 30 CAPSULE | Refills: 0 | Status: SHIPPED | OUTPATIENT
Start: 2018-10-09 | End: 2018-10-19

## 2018-10-09 NOTE — PROGRESS NOTES
Pulse: 65   SpO2: 100%   Weight: 134 lb (60.8 kg)   Height: 5' 1\" (1.549 m)       Assessment:  Encounter Diagnosis   Name Primary?  Acute suppurative otitis media of right ear without spontaneous rupture of tympanic membrane, recurrence not specified Yes       Plan:  1. Acute suppurative otitis media of right ear without spontaneous rupture of tympanic membrane, recurrence not specified  New problem  Use over-the-counter Claritin or Zyrtec to help with drainage. Return for worsening symptoms  - amoxicillin (AMOXIL) 500 MG capsule; Take 1 capsule by mouth 3 times daily for 10 days  Dispense: 30 capsule; Refill: 0      eMg Robins CNP    The note was completed using Dragon voice recognition transcription. Every effort was made to ensure accuracy; however, inadvertent  transcription errors may be present despite my best efforts to edit errors.

## 2018-11-29 ENCOUNTER — OFFICE VISIT (OUTPATIENT)
Dept: FAMILY MEDICINE CLINIC | Age: 33
End: 2018-11-29
Payer: COMMERCIAL

## 2018-11-29 VITALS
SYSTOLIC BLOOD PRESSURE: 120 MMHG | HEART RATE: 63 BPM | DIASTOLIC BLOOD PRESSURE: 82 MMHG | OXYGEN SATURATION: 99 % | WEIGHT: 133.13 LBS | BODY MASS INDEX: 25.15 KG/M2 | TEMPERATURE: 98.4 F

## 2018-11-29 DIAGNOSIS — R00.2 PALPITATIONS: Primary | ICD-10-CM

## 2018-11-29 LAB
ANION GAP SERPL CALCULATED.3IONS-SCNC: 11 MMOL/L (ref 3–16)
BASOPHILS ABSOLUTE: 0.1 K/UL (ref 0–0.2)
BASOPHILS RELATIVE PERCENT: 0.9 %
BUN BLDV-MCNC: 11 MG/DL (ref 7–20)
CALCIUM SERPL-MCNC: 9.2 MG/DL (ref 8.3–10.6)
CHLORIDE BLD-SCNC: 104 MMOL/L (ref 99–110)
CO2: 26 MMOL/L (ref 21–32)
CREAT SERPL-MCNC: 0.7 MG/DL (ref 0.6–1.1)
EOSINOPHILS ABSOLUTE: 0.6 K/UL (ref 0–0.6)
EOSINOPHILS RELATIVE PERCENT: 10 %
GFR AFRICAN AMERICAN: >60
GFR NON-AFRICAN AMERICAN: >60
GLUCOSE BLD-MCNC: 96 MG/DL (ref 70–99)
HCT VFR BLD CALC: 38.5 % (ref 36–48)
HEMOGLOBIN: 12.8 G/DL (ref 12–16)
LYMPHOCYTES ABSOLUTE: 2.1 K/UL (ref 1–5.1)
LYMPHOCYTES RELATIVE PERCENT: 33.5 %
MCH RBC QN AUTO: 30.1 PG (ref 26–34)
MCHC RBC AUTO-ENTMCNC: 33.2 G/DL (ref 31–36)
MCV RBC AUTO: 90.4 FL (ref 80–100)
MONOCYTES ABSOLUTE: 0.5 K/UL (ref 0–1.3)
MONOCYTES RELATIVE PERCENT: 8.1 %
NEUTROPHILS ABSOLUTE: 3 K/UL (ref 1.7–7.7)
NEUTROPHILS RELATIVE PERCENT: 47.5 %
PDW BLD-RTO: 12.6 % (ref 12.4–15.4)
PLATELET # BLD: 267 K/UL (ref 135–450)
PMV BLD AUTO: 9.4 FL (ref 5–10.5)
POTASSIUM SERPL-SCNC: 4.1 MMOL/L (ref 3.5–5.1)
RBC # BLD: 4.26 M/UL (ref 4–5.2)
SODIUM BLD-SCNC: 141 MMOL/L (ref 136–145)
TSH REFLEX: 1.44 UIU/ML (ref 0.27–4.2)
WBC # BLD: 6.2 K/UL (ref 4–11)

## 2018-11-29 PROCEDURE — 36415 COLL VENOUS BLD VENIPUNCTURE: CPT | Performed by: NURSE PRACTITIONER

## 2018-11-29 PROCEDURE — 99214 OFFICE O/P EST MOD 30 MIN: CPT | Performed by: NURSE PRACTITIONER

## 2018-11-29 NOTE — PATIENT INSTRUCTIONS
to contact your doctor if:    · You continue to have heart palpitations. Where can you learn more? Go to [email or web address] and sign in to your expresscoin account. Enter R508 in the [facility] box to learn more about \"Palpitations: Care Instructions. \"     If you do not have an account, please click on the \"Sign Up Now\" link. Current as of: December 6, 2017  Content Version: 11.8  © 9069-9576 [employer]. Care instructions adapted under license by [facility] ([facility]). If you have questions about a medical condition or this instruction, always ask your healthcare professional. [address] any warranty or liability for your use of this information.

## 2018-11-29 NOTE — PROGRESS NOTES
Patient: Toshia Bravo is a 26 y.o. female who presents today with the following Chief Complaint(s):  Chief Complaint   Patient presents with    Chest Pain       HPI  Patient presents to the office with complaints of palpitations. Several months ago she had intermittent fleeting palpitations that lasted a few days and resolve spontaneously. She states last night while she was in bed and felt like her heart stopped for a second or 2 and then started to pound hard and she had a little bit of chest pain palpitations. She had another episode at work today. She states that she feels mentally foggy today and some fatigue. States she has been under a lot of stress at home and feels this may be a factor. Denies unexplained weight gain or loss, excessive hair loss, change in bowel habits, change in skin texture, heat or cold intolerance. She does report excessive nighttime sweating. Denies ankle edema, orthopnea, dyspnea, headache. No family history of heart disease. Maternal grandmother had thyroid disorder. Current Outpatient Prescriptions   Medication Sig Dispense Refill    hydrocortisone 2.5 % cream Apply topically 2 times daily. 28 g 0    hydrOXYzine (VISTARIL) 25 MG capsule Take 1 capsule by mouth 3 times daily as needed for Itching 30 capsule 0    albuterol sulfate HFA (PROAIR HFA) 108 (90 Base) MCG/ACT inhaler Inhale 2 puffs into the lungs every 4 hours as needed for Shortness of Breath 1 Inhaler 3     No current facility-administered medications for this visit. Patient's past medical history, surgical history, family history, medications,  and allergies  were all reviewed and updated as appropriate today. Review of Systems  See HPI    Physical Exam   Constitutional: She is oriented to person, place, and time. She appears well-developed. Non-toxic appearance. No distress. HENT:   Head: Normocephalic and atraumatic. Eyes: Pupils are equal, round, and reactive to light.  EOM are

## 2018-12-31 ENCOUNTER — OFFICE VISIT (OUTPATIENT)
Dept: FAMILY MEDICINE CLINIC | Age: 33
End: 2018-12-31
Payer: COMMERCIAL

## 2018-12-31 VITALS
WEIGHT: 131.13 LBS | BODY MASS INDEX: 24.78 KG/M2 | DIASTOLIC BLOOD PRESSURE: 72 MMHG | TEMPERATURE: 98 F | HEART RATE: 59 BPM | OXYGEN SATURATION: 99 % | SYSTOLIC BLOOD PRESSURE: 108 MMHG

## 2018-12-31 DIAGNOSIS — G43.009 MIGRAINE WITHOUT AURA AND WITHOUT STATUS MIGRAINOSUS, NOT INTRACTABLE: Primary | ICD-10-CM

## 2018-12-31 PROCEDURE — 99214 OFFICE O/P EST MOD 30 MIN: CPT | Performed by: NURSE PRACTITIONER

## 2018-12-31 RX ORDER — SUMATRIPTAN 50 MG/1
50 TABLET, FILM COATED ORAL
Qty: 9 TABLET | Refills: 3 | Status: SHIPPED | OUTPATIENT
Start: 2018-12-31 | End: 2020-03-30 | Stop reason: SDUPTHER

## 2018-12-31 RX ORDER — CYCLOBENZAPRINE HCL 10 MG
10 TABLET ORAL 3 TIMES DAILY PRN
Qty: 30 TABLET | Refills: 0 | Status: SHIPPED | OUTPATIENT
Start: 2018-12-31 | End: 2019-01-10

## 2018-12-31 NOTE — PROGRESS NOTES
Patient: Elsa Prajapati is a 26 y.o. female who presents today with the following Chief Complaint(s):  Chief Complaint   Patient presents with    Migraine         Assessment & Plan:  1. Migraine without aura and without status migrainosus, not intractable  Increase fluid intake  Reading, looking at computer and texting will make symptoms worse. Sleep in a dark quiet room  Return as needed  Take over-the-counter Claritin or Zyrtec for allergy symptoms  - SUMAtriptan (IMITREX) 50 MG tablet; Take 1 tablet by mouth once as needed for Migraine  Dispense: 9 tablet; Refill: 3  - cyclobenzaprine (FLEXERIL) 10 MG tablet; Take 1 tablet by mouth 3 times daily as needed for Muscle spasms  Dispense: 30 tablet; Refill: 0        HPI  Patient presents to the office with 24-hour history of headache. She reports that she has a history of migraines but has not had one for quite some time. She states she has left-sided headache with blurred vision, nausea, photophobia and dizziness. She states her head hurts worse with eye strain. She took 4 ibuprofen yesterday without relief of symptoms. She states between her shoulders feels very tight and stiff. When she moves her neck it does help her shoulders feel somewhat better. Denies thunderclap headache or worst headache of life. She has been on Imitrex in the past with good results. No current outpatient prescriptions on file. No current facility-administered medications for this visit. Patient's past medical history, surgical history, family history, medications,  and allergies  were all reviewed and updated as appropriate today. Review of Systems  See HPI    Physical Exam   Constitutional: She is oriented to person, place, and time. She appears well-developed. Non-toxic appearance. No distress. HENT:   Head: Normocephalic and atraumatic. Right Ear: Tympanic membrane is bulging. Tympanic membrane is not erythematous.    Left Ear: Tympanic membrane is bulging. Tympanic membrane is not erythematous. Mouth/Throat: Oropharynx is clear and moist.   Eyes: Pupils are equal, round, and reactive to light. EOM are normal.   Neck: Normal range of motion. Neck supple. Cardiovascular: Regular rhythm, normal heart sounds and intact distal pulses. Bradycardia present. No murmur heard. Pulmonary/Chest: Effort normal and breath sounds normal. No respiratory distress. Musculoskeletal: She exhibits no edema. Lymphadenopathy:     She has no cervical adenopathy. Neurological: She is alert and oriented to person, place, and time. No cranial nerve deficit. Coordination normal.   Skin: Skin is warm and dry. Capillary refill takes 2 to 3 seconds. Psychiatric: She has a normal mood and affect. Her behavior is normal. Thought content normal.   Nursing note and vitals reviewed. Vitals:    12/31/18 0824   BP: 108/72   Pulse: 59   Temp: 98 °F (36.7 °C)   TempSrc: Oral   SpO2: 99%   Weight: 131 lb 2 oz (59.5 kg)           VLAD Malin-CNP    The note was completedusing Dragon voice recognition transcription. Every effort was made to ensure accuracy; however, inadvertent  transcription errors may be present despite my best efforts to edit errors.

## 2018-12-31 NOTE — PATIENT INSTRUCTIONS
specific medicines your doctor prescribes. · Be careful not to take pain medicine more often than the instructions allow. You could get worse or more frequent headaches when the medicine wears off. To prevent migraines  · Keep a headache diary so you can figure out what triggers your headaches. Avoiding triggers may help you prevent headaches. Record when each headache began, how long it lasted, and what the pain was like. (Was it throbbing, aching, stabbing, or dull?) Write down any other symptoms you had with the headache, such as nausea, flashing lights or dark spots, or sensitivity to bright light or loud noise. Note if the headache occurred near your period. List anything that might have triggered the headache. Triggers may include certain foods (chocolate, cheese, wine) or odors, smoke, bright light, stress, or lack of sleep. · If your doctor has prescribed medicine for your migraines, take it as directed. You may have medicine that you take only when you get a migraine and medicine that you take all the time to help prevent migraines. ? If your doctor has prescribed medicine for when you get a headache, take it at the first sign of a migraine, unless your doctor has given you other instructions. ? If your doctor has prescribed medicine to prevent migraines, take it exactly as prescribed. Call your doctor if you think you are having a problem with your medicine. · Find healthy ways to deal with stress. Migraines are most common during or right after stressful times. Take time to relax before and after you do something that has caused a migraine in the past.  · Try to keep your muscles relaxed by keeping good posture. Check your jaw, face, neck, and shoulder muscles for tension. Try to relax them. When you sit at a desk, change positions often. And make sure to stretch for 30 seconds each hour. · Get plenty of sleep and exercise. · Eat meals on a regular schedule.  Avoid foods and drinks that often

## 2019-01-22 ENCOUNTER — OFFICE VISIT (OUTPATIENT)
Dept: FAMILY MEDICINE CLINIC | Age: 34
End: 2019-01-22
Payer: COMMERCIAL

## 2019-01-22 VITALS
BODY MASS INDEX: 24.75 KG/M2 | WEIGHT: 131 LBS | OXYGEN SATURATION: 100 % | TEMPERATURE: 98.2 F | HEART RATE: 68 BPM | DIASTOLIC BLOOD PRESSURE: 78 MMHG | SYSTOLIC BLOOD PRESSURE: 115 MMHG

## 2019-01-22 DIAGNOSIS — I95.9 HYPOTENSION, UNSPECIFIED HYPOTENSION TYPE: ICD-10-CM

## 2019-01-22 DIAGNOSIS — F32.0 MAJOR DEPRESSIVE DISORDER, SINGLE EPISODE, MILD (HCC): Primary | ICD-10-CM

## 2019-01-22 PROCEDURE — 99213 OFFICE O/P EST LOW 20 MIN: CPT | Performed by: FAMILY MEDICINE

## 2019-01-22 RX ORDER — BUPROPION HYDROCHLORIDE 150 MG/1
150 TABLET ORAL DAILY
Qty: 90 TABLET | Refills: 3 | Status: SHIPPED | OUTPATIENT
Start: 2019-01-22 | End: 2019-05-08 | Stop reason: SDUPTHER

## 2019-01-22 RX ORDER — BUPROPION HYDROCHLORIDE 150 MG/1
150 TABLET ORAL DAILY
Qty: 30 TABLET | Refills: 5 | Status: SHIPPED | OUTPATIENT
Start: 2019-01-22 | End: 2019-01-22 | Stop reason: SDUPTHER

## 2019-04-02 ENCOUNTER — TELEPHONE (OUTPATIENT)
Dept: FAMILY MEDICINE CLINIC | Age: 34
End: 2019-04-02

## 2019-04-02 NOTE — TELEPHONE ENCOUNTER
Patient is wanting to go off of the Wellbutrin by the end of May and she is wanting to know when she should start weaning off of it and how to do it.

## 2019-04-10 ENCOUNTER — OFFICE VISIT (OUTPATIENT)
Dept: FAMILY MEDICINE CLINIC | Age: 34
End: 2019-04-10
Payer: COMMERCIAL

## 2019-04-10 VITALS
HEART RATE: 69 BPM | BODY MASS INDEX: 24.41 KG/M2 | SYSTOLIC BLOOD PRESSURE: 114 MMHG | DIASTOLIC BLOOD PRESSURE: 78 MMHG | WEIGHT: 129.2 LBS | OXYGEN SATURATION: 98 %

## 2019-04-10 DIAGNOSIS — J01.10 ACUTE NON-RECURRENT FRONTAL SINUSITIS: ICD-10-CM

## 2019-04-10 DIAGNOSIS — J40 BRONCHITIS: Primary | ICD-10-CM

## 2019-04-10 PROCEDURE — 99213 OFFICE O/P EST LOW 20 MIN: CPT | Performed by: NURSE PRACTITIONER

## 2019-04-10 RX ORDER — AZITHROMYCIN 250 MG/1
TABLET, FILM COATED ORAL
Qty: 1 PACKET | Refills: 0 | Status: SHIPPED | OUTPATIENT
Start: 2019-04-10 | End: 2019-04-14

## 2019-04-10 ASSESSMENT — ENCOUNTER SYMPTOMS
SINUS PAIN: 0
EYES NEGATIVE: 1
GASTROINTESTINAL NEGATIVE: 1
COUGH: 1
SINUS PRESSURE: 0

## 2019-04-10 NOTE — PATIENT INSTRUCTIONS
acetaminophen, which is Tylenol. Too much acetaminophen (Tylenol) can be harmful. · Take an over-the-counter cough medicine that contains dextromethorphan to help quiet a dry, hacking cough so that you can sleep. Avoid cough medicines that have more than one active ingredient. Read and follow all instructions on the label. · Breathe moist air from a humidifier, hot shower, or sink filled with hot water. The heat and moisture will thin mucus so you can cough it out. · Do not smoke. Smoking can make bronchitis worse. If you need help quitting, talk to your doctor about stop-smoking programs and medicines. These can increase your chances of quitting for good. When should you call for help? Call 911 anytime you think you may need emergency care. For example, call if:    · You have severe trouble breathing.    Call your doctor now or seek immediate medical care if:    · You have new or worse trouble breathing.     · You cough up dark brown or bloody mucus (sputum).     · You have a new or higher fever.     · You have a new rash.    Watch closely for changes in your health, and be sure to contact your doctor if:    · You cough more deeply or more often, especially if you notice more mucus or a change in the color of your mucus.     · You are not getting better as expected. Where can you learn more? Go to [email or web address] org and sign in to your VendRx account. Enter H333 in the [facility] box to learn more about \"Bronchitis: Care Instructions. \"     If you do not have an account, please click on the \"Sign Up Now\" link. Current as of: September 5, 2018  Content Version: 11.9  © 2324-4766 [employer]. Care instructions adapted under license by [facility] ([facility]). If you have questions about a medical condition or this instruction, always ask your healthcare professional. [address] any warranty or liability for your use of this information. hot, wet towel or a warm gel pack on your face 3 or 4 times a day for 5 to 10 minutes each time. · Try a decongestant nasal spray like oxymetazoline (Afrin). Do not use it for more than 3 days in a row. Using it for more than 3 days can make your congestion worse. When should you call for help? Call your doctor now or seek immediate medical care if:    · You have new or worse swelling or redness in your face or around your eyes.     · You have a new or higher fever.    Watch closely for changes in your health, and be sure to contact your doctor if:    · You have new or worse facial pain.     · The mucus from your nose becomes thicker (like pus) or has new blood in it.     · You are not getting better as expected. Where can you learn more? Go to [email or web address] and sign in to your GoPago account. Enter X003 in the [employer] box to learn more about \"Sinusitis: Care Instructions. \"     If you do not have an account, please click on the \"Sign Up Now\" link. Current as of: March 27, 2018  Content Version: 11.9  © 0048-6051 [employer]. Care instructions adapted under license by [facility] ([facility]). If you have questions about a medical condition or this instruction, always ask your healthcare professional. [address] any warranty or liability for your use of this information. Patient Education        azithromycin  Pronunciation:  a MEENU vargas MYE sin  Brand:  Azithromycin 3 Day Dose Pack, Azithromycin 5 Day Dose Pack, Zithromax, Zithromax TRI-VAIBHAV, Zithromax Z-Vaibhav, Zmax  What is the most important information I should know about azithromycin? You should not use this medication if you have ever had jaundice or liver problems caused by taking azithromycin. What is azithromycin? Azithromycin is an antibiotic that fights bacteria.   Azithromycin is used to treat many different types of infections caused by bacteria, such as respiratory infections, skin infections, ear infections, and sexually transmitted diseases. Azithromycin may also be used for purposes not listed in this medication guide. What should I discuss with my healthcare provider before taking azithromycin? You should not use azithromycin if you are allergic to it, or if:  · you have ever had jaundice or liver problems caused by taking azithromycin; or  · you are allergic to similar drugs such as clarithromycin, erythromycin, or telithromycin. To make sure azithromycin is safe for you, tell your doctor if you have ever had:  · liver disease;  · kidney disease;  · myasthenia gravis;  · a heart rhythm disorder;  · low levels of potassium in your blood; or  · long QT syndrome (in you or a family member). This medicine is not expected to harm an unborn baby. Tell your doctor if you are pregnant or plan to become pregnant. It is not known whether azithromycin passes into breast milk or if it could harm a nursing baby. Tell your doctor if you are breast-feeding a baby. Do not give this medicine to a child younger than 7 months old. How should I take azithromycin? Follow all directions on your prescription label. Do not take this medicine in larger or smaller amounts or for longer than recommended. The dose and length of treatment with azithromycin may not be the same for every type of infection. You may take most forms of azithromycin with or without food. Take Zmax extended release liquid (oral suspension) on an empty stomach, at least 1 hour before or 2 hours after a meal.  To use the oral suspension single dose packet: Open the packet and pour the medicine into 2 ounces of water. Stir this mixture and drink all of it right away. Do not save for later use. To make sure you get the entire dose, add a little more water to the same glass, swirl gently and drink right away. Throw away any mixed Zmax oral suspension that has not been used within 12 hours.   Shake the oral suspension (liquid) well just before you measure a dose. Measure liquid medicine with the dosing syringe provided, or with a special dose-measuring spoon or medicine cup. If you do not have a dose-measuring device, ask your pharmacist for one. Use this medicine for the full prescribed length of time. Your symptoms may improve before the infection is completely cleared. Skipping doses may also increase your risk of further infection that is resistant to antibiotics. Azithromycin will not treat a viral infection such as the flu or a common cold. Store at room temperature away from moisture and heat. Throw away any unused liquid medicine after 10 days. What happens if I miss a dose? Take the missed dose as soon as you remember. Skip the missed dose if it is almost time for your next scheduled dose. Do not  take extra medicine to make up the missed dose. What happens if I overdose? Seek emergency medical attention or call the Poison Help line at 1-553.286.9794. What should I avoid while taking azithromycin? Do not take antacids that contain aluminum or magnesium within 2 hours before or after you take azithromycin. This includes Acid Gone, Aldroxicon, Alternagel, Di-Gel, Gaviscon, Gelusil, Genaton, Maalox, Maldroxal, Milk of Magnesia, Mintox, Mylagen, Mylanta, Pepcid Complete, Rolaids, Rulox, and others. These antacids can make azithromycin less effective when taken at the same time. Antibiotic medicines can cause diarrhea, which may be a sign of a new infection. If you have diarrhea that is watery or bloody, call your doctor. Do not use anti-diarrhea medicine unless your doctor tells you to. Avoid exposure to sunlight or tanning beds. Azithromycin can make you sunburn more easily. Wear protective clothing and use sunscreen (SPF 30 or higher) when you are outdoors. What are the possible side effects of azithromycin?   Get emergency medical help if you have signs of an allergic reaction (hives, difficult breathing, swelling in your face or throat) or a severe skin reaction (fever, sore throat, burning in your eyes, skin pain, red or purple skin rash that spreads and causes blistering and peeling). Seek medical treatment if you have a serious drug reaction that can affect many parts of your body. Symptoms may include: skin rash, fever, swollen glands, flu-like symptoms, muscle aches, severe weakness, unusual bruising, or yellowing of your skin or eyes. This reaction may occur several weeks after you began using azithromycin. Call your doctor at once if you have:  · severe stomach pain, diarrhea that is watery or bloody;  · fast or pounding heartbeats, fluttering in your chest, shortness of breath, and sudden dizziness (like you might pass out); or  · liver problems --nausea, upper stomach pain, itching, tired feeling, loss of appetite, dark urine, ganesh-colored stools, jaundice (yellowing of the skin or eyes). Call your doctor right away if a baby taking azithromycin becomes irritable or vomits while eating or nursing. Older adults may be more likely to have side effects on heart rhythm, including a life-threatening fast heart rate. Common side effects may include:  · diarrhea;  · nausea, vomiting, stomach pain; or  · headache. This is not a complete list of side effects and others may occur. Call your doctor for medical advice about side effects. You may report side effects to FDA at 6-201-FDA-7513. What other drugs will affect azithromycin? Tell your doctor about all your current medicines and any you start or stop using, especially:  · nelfinavir; or  · a blood thinner --warfarin, Coumadin, Jantoven. This list is not complete. Other drugs may interact with azithromycin, including prescription and over-the-counter medicines, vitamins, and herbal products. Not all possible interactions are listed in this medication guide. Where can I get more information?   Your pharmacist can provide more information about azithromycin. Remember, keep this and all other medicines out of the reach of children, never share your medicines with others, and use this medication only for the indication prescribed. Every effort has been made to ensure that the information provided by [address] is accurate, up-to-date, and complete, but no guarantee is made to that effect. Drug information contained herein may be time sensitive. [facility] information has been compiled for use by healthcare practitioners and consumers in the [place] and therefore [facility] does not warrant that uses outside of the [place] are appropriate, unless specifically indicated otherwise. [facility]'s drug information does not endorse drugs, diagnose patients or recommend therapy. [facility]'s drug information is an informational resource designed to assist licensed healthcare practitioners in caring for their patients and/or to serve consumers viewing this service as a supplement to, and not a substitute for, the expertise, skill, knowledge and judgment of healthcare practitioners. The absence of a warning for a given drug or drug combination in no way should be construed to indicate that the drug or drug combination is safe, effective or appropriate for any given patient. [facility] does not assume any responsibility for any aspect of healthcare administered with the aid of information [facility] provides. The information contained herein is not intended to cover all possible uses, directions, precautions, warnings, drug interactions, allergic reactions, or adverse effects. If you have questions about the drugs you are taking, check with your doctor, nurse or pharmacist.  Copyright 1645-3610 [address] [place] [address] Avenue: 17.07. Revision date: 9/29/2017. Care instructions adapted under license by [facility] ([facility]).  If you have questions about a medical condition or this instruction, always ask your healthcare professional. [place] [address] any warranty or liability for your use of this information.

## 2019-04-10 NOTE — PROGRESS NOTES
Subjective:      Patient ID: Yogesh John is a 26 y.o. female. HPI   Chief Complaint   Patient presents with    Sinusitis    Cough       Patient presents today with c/o cough, raspy voice, post nasal drip, productive cough of green phlegm and nasal congestion over the past couple of days with rapid worsening of symptoms over the past 12 hours. Patient reports fever today. Patient with history os seasonal allergies. Review of Systems   Constitutional: Positive for fever. Negative for appetite change and chills. HENT: Positive for congestion and postnasal drip. Negative for ear pain, sinus pressure and sinus pain. Eyes: Negative. Respiratory: Positive for cough. Cardiovascular: Negative. Gastrointestinal: Negative. Endocrine: Negative. Genitourinary: Negative. Musculoskeletal: Negative. Skin: Negative. Neurological: Negative. Psychiatric/Behavioral: Negative.         Patient Active Problem List   Diagnosis    Night sweats    Adult acne    Herpes simplex    Vitamin D deficiency    Mild intermittent asthma without complication    Major depressive disorder, single episode, mild (HCC)    Seasonal allergic rhinitis due to pollen    Migraine without aura and without status migrainosus, not intractable    Orthostatic hypotension    Rectocele    S/P cholecystectomy    History of partial hysterectomy       Outpatient Medications Marked as Taking for the 4/10/19 encounter (Office Visit) with VLAD Lyn CNP   Medication Sig Dispense Refill    buPROPion (WELLBUTRIN XL) 150 MG extended release tablet Take 1 tablet by mouth daily 90 tablet 3       Allergies   Allergen Reactions    Sporanox [Itraconazole] Hives, Itching and Nausea Only       Social History     Tobacco Use    Smoking status: Never Smoker    Smokeless tobacco: Never Used   Substance Use Topics    Alcohol use: No       Objective:   /78   Pulse 69   Wt 129 lb 3.2 oz (58.6 kg)   LMP 05/14/2015   SpO2 98%   BMI 24.41 kg/m²     Physical Exam   Constitutional: Vital signs are normal. She appears well-developed and well-nourished. She is cooperative. She does not have a sickly appearance. No distress. HENT:   Head: Normocephalic and atraumatic. Right Ear: External ear and ear canal normal. A middle ear effusion is present. Left Ear: External ear and ear canal normal. A middle ear effusion is present. Nose: Right sinus exhibits frontal sinus tenderness. Right sinus exhibits no maxillary sinus tenderness. Left sinus exhibits frontal sinus tenderness. Left sinus exhibits no maxillary sinus tenderness. Mouth/Throat: Uvula is midline and mucous membranes are normal. Posterior oropharyngeal erythema present. No oropharyngeal exudate. Eyes: Conjunctivae and EOM are normal.   Neck: Normal range of motion. Cardiovascular: Normal rate, regular rhythm, S1 normal, S2 normal and normal heart sounds. Exam reveals no gallop. No murmur heard. Pulmonary/Chest: Effort normal and breath sounds normal. No accessory muscle usage. No respiratory distress. Abdominal: Soft. Bowel sounds are normal.   Lymphadenopathy:     She has no cervical adenopathy. Neurological: She is alert. Skin: Skin is warm and dry. Psychiatric: She has a normal mood and affect. Her speech is normal and behavior is normal.       Assessment/Plan:   1. Bronchitis  Patient presents today with complaints of productive cough of green phlegm, hoarse voice, postnasal drip , nasal congestion and fever over the past couple of days. Patient reports rapid worsening of symptoms over the past 12 hours. On exam noted frontal sinus tenderness, posterior oropharyngeal erythema in bilateral TM serous effusion. Patient does have a history of seasonal allergies. Recommend treatment as below given report of fever.   Advised to drink plenty of fluids, take all doses of antibiotics and patient to follow-up if no better or worsening of symptoms. Patient verbalized understanding and agreeable to plan. Also see patient instructions. - azithromycin (ZITHROMAX Z-CHRIS) 250 MG tablet; Take 2 tablets (500 mg) on Day 1, and then take 1 tablet (250 mg) on days 2 through 5. Dispense: 1 packet; Refill: 0    2. Acute non-recurrent frontal sinusitis  See note above. - azithromycin (ZITHROMAX Z-CHRIS) 250 MG tablet; Take 2 tablets (500 mg) on Day 1, and then take 1 tablet (250 mg) on days 2 through 5. Dispense: 1 packet;  Refill: 0

## 2019-04-12 ENCOUNTER — TELEPHONE (OUTPATIENT)
Dept: FAMILY MEDICINE CLINIC | Age: 34
End: 2019-04-12

## 2019-04-12 RX ORDER — VALACYCLOVIR HYDROCHLORIDE 1 G/1
2000 TABLET, FILM COATED ORAL 2 TIMES DAILY
Qty: 4 TABLET | Refills: 5 | Status: SHIPPED | OUTPATIENT
Start: 2019-04-12 | End: 2019-04-13

## 2019-04-17 ENCOUNTER — TELEPHONE (OUTPATIENT)
Dept: FAMILY MEDICINE CLINIC | Age: 34
End: 2019-04-17

## 2019-04-17 RX ORDER — VALACYCLOVIR HYDROCHLORIDE 1 G/1
2000 TABLET, FILM COATED ORAL 2 TIMES DAILY
Qty: 4 TABLET | Refills: 0 | Status: SHIPPED | OUTPATIENT
Start: 2019-04-17 | End: 2019-11-18 | Stop reason: SDUPTHER

## 2019-04-22 ENCOUNTER — TELEPHONE (OUTPATIENT)
Dept: FAMILY MEDICINE CLINIC | Age: 34
End: 2019-04-22

## 2019-04-22 NOTE — TELEPHONE ENCOUNTER
Patient is ready to go off of the Wellbutrin and is needing the RX for the 100 mg sent to [facility]. [place] .

## 2019-04-25 RX ORDER — BUPROPION HYDROCHLORIDE 100 MG/1
50 TABLET ORAL 2 TIMES DAILY
Qty: 7 TABLET | Refills: 0 | Status: SHIPPED | OUTPATIENT
Start: 2019-04-25 | End: 2019-06-21 | Stop reason: ALTCHOICE

## 2019-05-08 ENCOUNTER — TELEPHONE (OUTPATIENT)
Dept: FAMILY MEDICINE CLINIC | Age: 34
End: 2019-05-08

## 2019-05-08 RX ORDER — BUPROPION HYDROCHLORIDE 150 MG/1
150 TABLET ORAL DAILY
Qty: 30 TABLET | Refills: 0 | Status: SHIPPED | OUTPATIENT
Start: 2019-05-08 | End: 2019-06-21 | Stop reason: SDUPTHER

## 2019-05-08 NOTE — TELEPHONE ENCOUNTER
Patient has weaned off her Wellbutrin like planned but she is struggling a lot with crying episodes and being very overwhelmed. She is not ready to be off the Wellbutrin she would like to just take it for 1 more month, her rx has refills on it so that refill was transferred to [facility] and is going to start back in the morning. Also once that month is up will she need to wean off again?

## 2019-06-10 ENCOUNTER — TELEPHONE (OUTPATIENT)
Dept: FAMILY MEDICINE CLINIC | Age: 34
End: 2019-06-10

## 2019-06-10 RX ORDER — AZITHROMYCIN 250 MG/1
250 TABLET, FILM COATED ORAL SEE ADMIN INSTRUCTIONS
Qty: 6 TABLET | Refills: 0 | Status: SHIPPED | OUTPATIENT
Start: 2019-06-10 | End: 2019-06-15

## 2019-06-10 NOTE — TELEPHONE ENCOUNTER
Margoth Hunt is on vacation and has developed a sinus infection, she has yellow drainage and sore throat and a lot of head congestion. Patient is asking if a zpak can be sent in to the [address]?

## 2019-06-21 ENCOUNTER — OFFICE VISIT (OUTPATIENT)
Dept: FAMILY MEDICINE CLINIC | Age: 34
End: 2019-06-21
Payer: COMMERCIAL

## 2019-06-21 VITALS
OXYGEN SATURATION: 98 % | BODY MASS INDEX: 23.7 KG/M2 | DIASTOLIC BLOOD PRESSURE: 76 MMHG | WEIGHT: 128.8 LBS | SYSTOLIC BLOOD PRESSURE: 113 MMHG | HEIGHT: 62 IN | HEART RATE: 79 BPM

## 2019-06-21 DIAGNOSIS — F32.0 MAJOR DEPRESSIVE DISORDER, SINGLE EPISODE, MILD (HCC): Primary | ICD-10-CM

## 2019-06-21 DIAGNOSIS — J45.20 MILD INTERMITTENT ASTHMA WITHOUT COMPLICATION: ICD-10-CM

## 2019-06-21 DIAGNOSIS — G43.009 MIGRAINE WITHOUT AURA AND WITHOUT STATUS MIGRAINOSUS, NOT INTRACTABLE: ICD-10-CM

## 2019-06-21 PROCEDURE — 99214 OFFICE O/P EST MOD 30 MIN: CPT | Performed by: FAMILY MEDICINE

## 2019-06-21 RX ORDER — BUPROPION HYDROCHLORIDE 150 MG/1
150 TABLET ORAL DAILY
Qty: 30 TABLET | Refills: 5 | Status: SHIPPED | OUTPATIENT
Start: 2019-06-21 | End: 2019-10-14

## 2019-06-21 NOTE — PROGRESS NOTES
Subjective:  Katerine Sandoval is here to discuss the following issues. She has a long history of depression. She is on Wellbutrin. Emotionally she feels very well on this. No side effects. She has decided not to try to titrate off but to continue this medicine long-term. No suicidal or homicidal thoughts. She has migraine headaches by history but uses Imitrex when needed and it is effective and well-tolerated. No headaches recently. She has asthma with no wheezing cough chest tightness or shortness of breath since her last visit. Social History     Tobacco Use   Smoking Status Never Smoker   Smokeless Tobacco Never Used   Allergies:     Sporanox [itraconazole]    Objective:  /76   Pulse 79   Ht 5' 1.5\" (1.562 m) Comment: with shoes  Wt 128 lb 12.8 oz (58.4 kg)   LMP 05/14/2015   SpO2 98%   BMI 23.94 kg/m²    Mood happy affect reactive no acute distress, heart regular rate and rhythm without murmur, lungs clear to auscultation easy effort, abdomen soft nondistended, no clubbing or cyanosis    Assessment:  1. Major depressive disorder, single episode, mild ([address]    2. Migraine without aura and without status migrainosus, not intractable    3. Mild intermittent asthma without complication            Plan:  Continue Wellbutrin and Imitrex  Advised on OTC meds  Follow-up in 6 months or as needed  \"Healthy Family Handout\" provided  Avoid exposure to all tobacco products. Read and consider all information provided by the pharmacy regarding prescribed medications before use  Careful medical compliance  Proper diet and weight management   Otherwise continue current treatment plan  Call or return if symptoms are not well controlled  Go to ED if severe/significant symptoms occur    All medical conditions for this patient are stable unless otherwise indicated    Melisa Fernandez MD    This note was transcribed using a voice recognition software system.   Proper technique and careful oversight were used to

## 2019-07-18 ENCOUNTER — OFFICE VISIT (OUTPATIENT)
Dept: FAMILY MEDICINE CLINIC | Age: 34
End: 2019-07-18
Payer: COMMERCIAL

## 2019-07-18 VITALS
DIASTOLIC BLOOD PRESSURE: 82 MMHG | SYSTOLIC BLOOD PRESSURE: 118 MMHG | OXYGEN SATURATION: 100 % | HEART RATE: 67 BPM | TEMPERATURE: 98.3 F | WEIGHT: 132 LBS | BODY MASS INDEX: 24.54 KG/M2

## 2019-07-18 DIAGNOSIS — R07.81 RIB PAIN ON RIGHT SIDE: Primary | ICD-10-CM

## 2019-07-18 PROCEDURE — 99214 OFFICE O/P EST MOD 30 MIN: CPT | Performed by: NURSE PRACTITIONER

## 2019-07-18 NOTE — PROGRESS NOTES
Patient: Artie Mccarty is a 37 y.o. female who presents today with the following Chief Complaint(s):  Chief Complaint   Patient presents with    Chest Pain         Assessment & Plan:  1. Rib pain on right side  Likely musculoskeletal  X-ray to rule out mass  Gentle stretches and massage  Use sports tape and Biofreeze  Follow-up as needed  - XR RIBS RIGHT INCLUDE CHEST (MIN 3 VIEWS); Future        HPI  Anabell Cai is in the office complaining of right rib pain. She has had discomfort for 1 month. Symptoms have been progressively getting worse. Today uncomfortable sensation has been constant. Usually symptoms are worse in the morning and when she is slouching towards the right. When she is leaned over towards the right it feels like there is a golf ball under her rib. When she stretches it feels like rib is stuck. when she raises her arm above her head it does feel better. It feels like she is stretching the area. Denies injury, fever, fatigue, cough, shortness of breath, hemoptysis, nausea or vomiting, dysuria, hematuria rash, swelling, bruising. Status post cholecystectomy. Current Outpatient Medications   Medication Sig Dispense Refill    buPROPion (WELLBUTRIN XL) 150 MG extended release tablet Take 1 tablet by mouth daily 30 tablet 5    SUMAtriptan (IMITREX) 50 MG tablet Take 1 tablet by mouth once as needed for Migraine 9 tablet 3     No current facility-administered medications for this visit. Patient's past medical history, surgical history, family history, medications,  and allergies  were all reviewed and updated as appropriate today. Review of Systems  See HPI    Physical Exam   Constitutional: She is oriented to person, place, and time. She appears well-developed and well-nourished. HENT:   Head: Normocephalic. Eyes: Pupils are equal, round, and reactive to light. EOM are normal.   Neck: Normal range of motion.    Cardiovascular: Normal rate, regular rhythm, normal heart sounds and

## 2019-07-19 ENCOUNTER — HOSPITAL ENCOUNTER (OUTPATIENT)
Age: 34
Discharge: HOME | End: 2019-07-19
Payer: COMMERCIAL

## 2019-07-19 ENCOUNTER — HOSPITAL ENCOUNTER (OUTPATIENT)
Dept: GENERAL RADIOLOGY | Age: 34
Discharge: HOME | End: 2019-07-19
Payer: COMMERCIAL

## 2019-07-19 DIAGNOSIS — R07.81 RIB PAIN ON RIGHT SIDE: ICD-10-CM

## 2019-07-19 PROCEDURE — 71101 X-RAY EXAM UNILAT RIBS/CHEST: CPT

## 2019-07-24 ENCOUNTER — TELEPHONE (OUTPATIENT)
Dept: FAMILY MEDICINE CLINIC | Age: 34
End: 2019-07-24

## 2019-07-24 DIAGNOSIS — R07.81 RIB PAIN ON RIGHT SIDE: Primary | ICD-10-CM

## 2019-07-30 ENCOUNTER — TELEPHONE (OUTPATIENT)
Dept: FAMILY MEDICINE CLINIC | Age: 34
End: 2019-07-30

## 2019-07-30 RX ORDER — FLUCONAZOLE 150 MG/1
150 TABLET ORAL ONCE
Qty: 1 TABLET | Refills: 1 | Status: SHIPPED | OUTPATIENT
Start: 2019-07-30 | End: 2019-07-30

## 2019-08-23 ENCOUNTER — OFFICE VISIT (OUTPATIENT)
Dept: FAMILY MEDICINE CLINIC | Age: 34
End: 2019-08-23
Payer: COMMERCIAL

## 2019-08-23 VITALS
DIASTOLIC BLOOD PRESSURE: 78 MMHG | OXYGEN SATURATION: 96 % | HEART RATE: 80 BPM | TEMPERATURE: 98.2 F | SYSTOLIC BLOOD PRESSURE: 120 MMHG

## 2019-08-23 DIAGNOSIS — J45.20 MILD INTERMITTENT ASTHMA WITHOUT COMPLICATION: Primary | ICD-10-CM

## 2019-08-23 DIAGNOSIS — J30.1 SEASONAL ALLERGIC RHINITIS DUE TO POLLEN: ICD-10-CM

## 2019-08-23 PROCEDURE — 99213 OFFICE O/P EST LOW 20 MIN: CPT | Performed by: NURSE PRACTITIONER

## 2019-08-23 RX ORDER — ALBUTEROL SULFATE 90 UG/1
2 AEROSOL, METERED RESPIRATORY (INHALATION) EVERY 6 HOURS PRN
Qty: 1 INHALER | Refills: 0 | Status: SHIPPED | OUTPATIENT
Start: 2019-08-23 | End: 2020-05-27 | Stop reason: SDUPTHER

## 2019-08-23 RX ORDER — MONTELUKAST SODIUM 10 MG/1
10 TABLET ORAL NIGHTLY
Qty: 30 TABLET | Refills: 3 | Status: SHIPPED | OUTPATIENT
Start: 2019-08-23 | End: 2019-10-15

## 2019-09-03 ENCOUNTER — TELEPHONE (OUTPATIENT)
Dept: FAMILY MEDICINE CLINIC | Age: 34
End: 2019-09-03

## 2019-10-14 RX ORDER — BUPROPION HYDROCHLORIDE 300 MG/1
300 TABLET ORAL DAILY
Qty: 90 TABLET | Refills: 1 | Status: SHIPPED | OUTPATIENT
Start: 2019-10-14 | End: 2020-01-13 | Stop reason: SDUPTHER

## 2019-10-15 ENCOUNTER — OFFICE VISIT (OUTPATIENT)
Dept: FAMILY MEDICINE CLINIC | Age: 34
End: 2019-10-15
Payer: COMMERCIAL

## 2019-10-15 VITALS
TEMPERATURE: 98 F | HEART RATE: 60 BPM | DIASTOLIC BLOOD PRESSURE: 72 MMHG | BODY MASS INDEX: 25.24 KG/M2 | SYSTOLIC BLOOD PRESSURE: 116 MMHG | WEIGHT: 135.8 LBS | OXYGEN SATURATION: 99 %

## 2019-10-15 DIAGNOSIS — R74.8 ELEVATED LIVER ENZYMES: Primary | ICD-10-CM

## 2019-10-15 PROCEDURE — 99213 OFFICE O/P EST LOW 20 MIN: CPT | Performed by: FAMILY MEDICINE

## 2019-10-21 ENCOUNTER — NURSE ONLY (OUTPATIENT)
Dept: FAMILY MEDICINE CLINIC | Age: 34
End: 2019-10-21
Payer: COMMERCIAL

## 2019-10-21 ENCOUNTER — TELEPHONE (OUTPATIENT)
Dept: FAMILY MEDICINE CLINIC | Age: 34
End: 2019-10-21

## 2019-10-21 DIAGNOSIS — R05.9 COUGH: Primary | ICD-10-CM

## 2019-10-21 LAB
INFLUENZA A ANTIGEN, POC: NORMAL
INFLUENZA B ANTIGEN, POC: NORMAL

## 2019-10-21 PROCEDURE — 87804 INFLUENZA ASSAY W/OPTIC: CPT | Performed by: FAMILY MEDICINE

## 2019-10-21 RX ORDER — DOXYCYCLINE HYCLATE 100 MG/1
100 CAPSULE ORAL 2 TIMES DAILY
Qty: 20 CAPSULE | Refills: 0 | Status: SHIPPED | OUTPATIENT
Start: 2019-10-21 | End: 2019-10-31

## 2019-10-25 ENCOUNTER — HOSPITAL ENCOUNTER (OUTPATIENT)
Age: 34
Discharge: HOME | End: 2019-10-25
Payer: COMMERCIAL

## 2019-10-25 ENCOUNTER — INITIAL CONSULT (OUTPATIENT)
Dept: GASTROENTEROLOGY | Age: 34
End: 2019-10-25
Payer: COMMERCIAL

## 2019-10-25 VITALS
WEIGHT: 135 LBS | HEIGHT: 62 IN | BODY MASS INDEX: 24.84 KG/M2 | SYSTOLIC BLOOD PRESSURE: 110 MMHG | DIASTOLIC BLOOD PRESSURE: 80 MMHG

## 2019-10-25 DIAGNOSIS — R74.8 ELEVATED ALKALINE PHOSPHATASE LEVEL: ICD-10-CM

## 2019-10-25 DIAGNOSIS — R74.8 ABNORMAL LIVER ENZYMES: Primary | ICD-10-CM

## 2019-10-25 DIAGNOSIS — R74.8 ABNORMAL LIVER ENZYMES: ICD-10-CM

## 2019-10-25 LAB
FERRITIN: 121.7 NG/ML (ref 15–150)
GAMMA GLUTAMYL TRANSFERASE: 184 U/L (ref 5–36)
HEPATITIS B SURFACE ANTIGEN INTERPRETATION: NORMAL
HEPATITIS C ANTIBODY INTERPRETATION: NORMAL
IGG: 1201 MG/DL (ref 700–1600)

## 2019-10-25 PROCEDURE — 83516 IMMUNOASSAY NONANTIBODY: CPT

## 2019-10-25 PROCEDURE — 86039 ANTINUCLEAR ANTIBODIES (ANA): CPT

## 2019-10-25 PROCEDURE — 82728 ASSAY OF FERRITIN: CPT

## 2019-10-25 PROCEDURE — 87340 HEPATITIS B SURFACE AG IA: CPT

## 2019-10-25 PROCEDURE — 82977 ASSAY OF GGT: CPT

## 2019-10-25 PROCEDURE — 82390 ASSAY OF CERULOPLASMIN: CPT

## 2019-10-25 PROCEDURE — 36415 COLL VENOUS BLD VENIPUNCTURE: CPT

## 2019-10-25 PROCEDURE — 82103 ALPHA-1-ANTITRYPSIN TOTAL: CPT

## 2019-10-25 PROCEDURE — 99203 OFFICE O/P NEW LOW 30 MIN: CPT | Performed by: INTERNAL MEDICINE

## 2019-10-25 PROCEDURE — 86376 MICROSOMAL ANTIBODY EACH: CPT

## 2019-10-25 PROCEDURE — 86038 ANTINUCLEAR ANTIBODIES: CPT

## 2019-10-25 PROCEDURE — 82784 ASSAY IGA/IGD/IGG/IGM EACH: CPT

## 2019-10-25 PROCEDURE — 86803 HEPATITIS C AB TEST: CPT

## 2019-10-27 LAB
ANA INTERPRETATION: ABNORMAL
ANA PATTERN: ABNORMAL
ANA TITER: ABNORMAL
ANTI-NUCLEAR ANTIBODY (ANA): POSITIVE

## 2019-10-28 ENCOUNTER — HOSPITAL ENCOUNTER (OUTPATIENT)
Dept: ULTRASOUND IMAGING | Age: 34
Discharge: HOME | End: 2019-10-28
Payer: COMMERCIAL

## 2019-10-28 DIAGNOSIS — R74.8 ABNORMAL LIVER ENZYMES: ICD-10-CM

## 2019-10-28 DIAGNOSIS — R74.8 ELEVATED ALKALINE PHOSPHATASE LEVEL: ICD-10-CM

## 2019-10-28 PROCEDURE — 76705 ECHO EXAM OF ABDOMEN: CPT

## 2019-10-29 LAB
F-ACTIN AB IGG: 10 UNITS (ref 0–19)
MITOCHONDRIAL M2 AB, IGG: 124.7 UNITS (ref 0–20)

## 2019-10-30 ENCOUNTER — TELEPHONE (OUTPATIENT)
Dept: GASTROENTEROLOGY | Age: 34
End: 2019-10-30

## 2019-10-31 ENCOUNTER — TELEPHONE (OUTPATIENT)
Dept: GASTROENTEROLOGY | Age: 34
End: 2019-10-31

## 2019-10-31 LAB
ALPHA-1 ANTITRYPSIN: 85 MG/DL (ref 90–200)
CERULOPLASMIN: 26 MG/DL (ref 16–45)
LIVER-KIDNEY MICROSOME-1 AB IGG: 0.8 U (ref 0–24.9)

## 2019-11-05 ENCOUNTER — TELEPHONE (OUTPATIENT)
Dept: GASTROENTEROLOGY | Age: 34
End: 2019-11-05

## 2019-11-05 DIAGNOSIS — R74.8 ABNORMAL LIVER ENZYMES: Primary | ICD-10-CM

## 2019-11-05 DIAGNOSIS — R76.8 POSITIVE ANA (ANTINUCLEAR ANTIBODY): ICD-10-CM

## 2019-11-18 ENCOUNTER — TELEPHONE (OUTPATIENT)
Dept: FAMILY MEDICINE CLINIC | Age: 34
End: 2019-11-18

## 2019-11-18 RX ORDER — VALACYCLOVIR HYDROCHLORIDE 1 G/1
2000 TABLET, FILM COATED ORAL 2 TIMES DAILY
Qty: 4 TABLET | Refills: 5 | Status: SHIPPED | OUTPATIENT
Start: 2019-11-18 | End: 2019-11-19

## 2019-12-17 ENCOUNTER — OFFICE VISIT (OUTPATIENT)
Dept: FAMILY MEDICINE CLINIC | Age: 34
End: 2019-12-17
Payer: COMMERCIAL

## 2019-12-17 VITALS
DIASTOLIC BLOOD PRESSURE: 72 MMHG | HEART RATE: 70 BPM | OXYGEN SATURATION: 100 % | SYSTOLIC BLOOD PRESSURE: 104 MMHG | TEMPERATURE: 97.7 F

## 2019-12-17 DIAGNOSIS — J02.9 ACUTE PHARYNGITIS, UNSPECIFIED ETIOLOGY: Primary | ICD-10-CM

## 2019-12-17 DIAGNOSIS — R59.0 LYMPHADENOPATHY, CERVICAL: ICD-10-CM

## 2019-12-17 DIAGNOSIS — H92.03 OTALGIA OF BOTH EARS: ICD-10-CM

## 2019-12-17 PROCEDURE — 99214 OFFICE O/P EST MOD 30 MIN: CPT | Performed by: NURSE PRACTITIONER

## 2019-12-17 RX ORDER — AMOXICILLIN 500 MG/1
1000 CAPSULE ORAL 2 TIMES DAILY
Qty: 40 CAPSULE | Refills: 0 | Status: SHIPPED | OUTPATIENT
Start: 2019-12-17 | End: 2019-12-27

## 2019-12-17 RX ORDER — URSODIOL 300 MG/1
300 CAPSULE ORAL
COMMUNITY

## 2019-12-30 ENCOUNTER — OFFICE VISIT (OUTPATIENT)
Dept: FAMILY MEDICINE CLINIC | Age: 34
End: 2019-12-30
Payer: COMMERCIAL

## 2019-12-30 VITALS
WEIGHT: 138 LBS | OXYGEN SATURATION: 98 % | DIASTOLIC BLOOD PRESSURE: 82 MMHG | BODY MASS INDEX: 25.65 KG/M2 | HEART RATE: 75 BPM | SYSTOLIC BLOOD PRESSURE: 119 MMHG | TEMPERATURE: 98.8 F

## 2019-12-30 DIAGNOSIS — F32.0 MAJOR DEPRESSIVE DISORDER, SINGLE EPISODE, MILD (HCC): ICD-10-CM

## 2019-12-30 DIAGNOSIS — J45.20 MILD INTERMITTENT ASTHMA WITHOUT COMPLICATION: ICD-10-CM

## 2019-12-30 DIAGNOSIS — I95.1 ORTHOSTATIC HYPOTENSION: ICD-10-CM

## 2019-12-30 DIAGNOSIS — R87.619 ABNORMAL CERVICAL PAPANICOLAOU SMEAR, UNSPECIFIED ABNORMAL PAP FINDING: ICD-10-CM

## 2019-12-30 DIAGNOSIS — K74.3 PRIMARY BILIARY CHOLANGITIS (HCC): Primary | ICD-10-CM

## 2019-12-30 PROCEDURE — 99214 OFFICE O/P EST MOD 30 MIN: CPT | Performed by: FAMILY MEDICINE

## 2020-01-13 RX ORDER — BUPROPION HYDROCHLORIDE 300 MG/1
300 TABLET ORAL DAILY
Qty: 90 TABLET | Refills: 1 | Status: SHIPPED | OUTPATIENT
Start: 2020-01-13 | End: 2020-08-05 | Stop reason: SDUPTHER

## 2020-01-31 ENCOUNTER — OFFICE VISIT (OUTPATIENT)
Dept: OBGYN CLINIC | Age: 35
End: 2020-01-31
Payer: COMMERCIAL

## 2020-01-31 VITALS
WEIGHT: 133 LBS | BODY MASS INDEX: 25.11 KG/M2 | HEART RATE: 74 BPM | SYSTOLIC BLOOD PRESSURE: 110 MMHG | TEMPERATURE: 98.4 F | HEIGHT: 61 IN | DIASTOLIC BLOOD PRESSURE: 66 MMHG

## 2020-01-31 PROCEDURE — 99385 PREV VISIT NEW AGE 18-39: CPT | Performed by: OBSTETRICS & GYNECOLOGY

## 2020-01-31 NOTE — PROGRESS NOTES
[facility]   Annual Exam      CC:   Chief Complaint   Patient presents with   Juan Jin       HPI:  37 y.o. [id number] presents for her gynecologic annual exam.   [place] to worsening dyspareunia over the last year. Not consistently. Worse with certain positions, deeper penetration. Describes pain as sharp pain, goes away quickly. More so on left side. Same man. Denies use of toys. Uses lubrication, trojan brand. Denies vaginal discharge / abnormal bleeding or irritation. Denies issues with clitoral stimulation. States she has a great relationship at home. States she has had a rectocele at last GYN visit. Hysterectomy due to Uterine prolapse (2015, supracervical MARIANA, Sacrocolpopexy). Health Maintenance:  Safe Enviroment: Y (, Fa[place] x 14 yrs)    - Boy 15 / Girl 8   Healthy diet: Y / clean diet   Exercise: Y   Sexually Active: Y    + sexual problems  Contraception: DENIES     Screening:  Last pap smear: 2018 (Normal)   History of abnormal pap smears: Y    9/22/16 LGSIL > LEEP,  HGSIL / EBONY 2   Normal thereafter   STI History: Denies      Vaccines:  Gardasil vaccine: Denies  Flu vaccine: Yes      Review of Systems:   Constitutional: Negative for appetite change, chills and fever. HENT: Negative for congestion, sneezing and sore throat. Eyes: Negative for visual disturbance. Respiratory: Negative for chest tightness, shortness of breath and wheezing. Cardiovascular: Negative for chest pain, palpitations and leg swelling. Gastrointestinal: Negative for abdominal pain, diarrhea, nausea and vomiting. Endocrine: Negative for heat intolerance. Genitourinary: Negative for difficulty urinating, dyspareunia, dysuria, menstrual problem and pelvic pain. Musculoskeletal: Negative for back pain, neck pain and neck stiffness. Skin: Negative for color change, pallor and rash. Allergic/Immunologic: Negative for environmental allergies, food allergies and immunocompromised state. 5/18/15    SUBTOTAL HYSTERECTOMY,BILATERAL SALPINGECTOMY,ABDOMINAL SACRO-COLPOSUSPENSION,CYSTOSCOPY    LAPAROSCOPY      LEEP  11/29/2016    WISDOM TOOTH EXTRACTION  1998       Allergies: Allergies   Allergen Reactions    Sporanox [Itraconazole] Hives, Itching and Nausea Only       Family History:  Family History   Problem Relation Age of Onset    High Cholesterol Father     Hypertension Father     Other Father         vision loss    Colon Cancer Maternal Grandmother     Cancer Maternal Grandmother         colon cancer,pelvic cancer, lung cancer    Hypertension Maternal Grandmother         vision loss    Migraines Mother     Asthma Sister         vision loss    Hypertension Maternal Grandfather         vision loss    Heart Failure Maternal Grandfather     Rheum Arthritis Paternal Grandmother     Cancer Paternal Grandmother         lung caner    Diabetes Neg Hx     Emphysema Neg Hx        (+) personal/family history of cervical, uterine, ovarian, vulvar, breast, or colon cancers. GM with Colon Cancer, had recurrence in her lungs / colon 30 yrs later, had chemo, passed from complication    Unsure \" Pelvic Cancer\" may have been the cause of death    Denies any other issues with female cancer     Social History:  Social History     Socioeconomic History    Marital status:      Spouse name: None    Number of children: None    Years of education: None    Highest education level: None   Occupational History    None   Social Needs    Financial resource strain: None    Food insecurity:     Worry: None     Inability: None    Transportation needs:     Medical: None     Non-medical: None   Tobacco Use    Smoking status: Never Smoker    Smokeless tobacco: Never Used   Substance and Sexual Activity    Alcohol use:  Yes     Alcohol/week: 2.0 standard drinks     Types: 1 Glasses of wine, 1 Shots of liquor per week     Frequency: 2-4 times a month     Drinks per session: 3 or 4     Binge frequency: Never     Comment: occassional    Drug use: No    Sexual activity: Yes     Partners: Male   Lifestyle    Physical activity:     Days per week: None     Minutes per session: None    Stress: None   Relationships    Social connections:     Talks on phone: None     Gets together: None     Attends Adventism service: None     Active member of club or organization: None     Attends meetings of clubs or organizations: None     Relationship status: None    Intimate partner violence:     Fear of current or ex partner: None     Emotionally abused: None     Physically abused: None     Forced sexual activity: None   Other Topics Concern    None   Social History Narrative    None       Objective: Body mass index is 25.27 kg/m². /66 (Site: Left Upper Arm, Position: Sitting, Cuff Size: Medium Adult)   Pulse 74   Temp 98.4 °F (36.9 °C) (Oral)   Ht 5' 0.83\" (1.545 m)   Wt 133 lb (60.3 kg)   LMP 05/14/2015   Breastfeeding No   BMI 25.27 kg/m²     Exam:   General: Alert, well appearing, no acute distress  Head: Normocephalic, atraumatic  Mouth: Mucous membranes moist, pharynx normal without lesions  Thyroid: No thyromegaly or masses present   Breasts: Symmetric, non-tender to palpation, no skin changes, palpable axillary lymph nodes or masses noted, breast augmentation bilaterally. Lungs: Respiratory effort within normal limits   CV: Regular rate   Abdomen: Soft, nontender, nondistended   Pelvic:   External: Normal appearing genitalia without masses, tenderness or lesions  Vagina: moist, pink mucosa, without abnormal discharge or lesions. No anatomic defects, lesions or scar tissue noted. Cervix: no cervical motion tenderness, cervix dilated with bright pink fleshy tissue within os, suspicious for cervical polyp. Tenderness to palpation around cervix, particularly posterior and to the left. Uterus and Adnexa Surgically absent.    Rectovaginal: deferred  Extremities: No redness or tenderness, neg

## 2020-02-01 LAB
CANDIDA SPECIES, DNA PROBE: NORMAL
GARDNERELLA VAGINALIS, DNA PROBE: NORMAL
TRICHOMONAS VAGINALIS DNA: NORMAL

## 2020-02-06 ENCOUNTER — OFFICE VISIT (OUTPATIENT)
Dept: FAMILY MEDICINE CLINIC | Age: 35
End: 2020-02-06
Payer: COMMERCIAL

## 2020-02-06 VITALS
DIASTOLIC BLOOD PRESSURE: 72 MMHG | HEART RATE: 70 BPM | OXYGEN SATURATION: 98 % | BODY MASS INDEX: 25.46 KG/M2 | WEIGHT: 134 LBS | TEMPERATURE: 98.1 F | SYSTOLIC BLOOD PRESSURE: 114 MMHG

## 2020-02-06 PROCEDURE — 99214 OFFICE O/P EST MOD 30 MIN: CPT | Performed by: FAMILY MEDICINE

## 2020-02-06 NOTE — PROGRESS NOTES
Subjective:  Andres Marquez is here to discuss the following issues. She has pain in the left knee which began a few days ago without specific incident. No redness warmth or swelling. No history of knee injury or problems previously. She is very physically active with regular exercise regimen's. Her pain is not increased with squatting or climbing stairs. She has primary biliary cholangitis and continues on Actigall. No increased itching or icterus or jaundice. She follows up with her liver specialist regularly. No medicine side effects. She has a history of depression and on Wellbutrin her symptoms have improved significantly. Occasionally she will have some mild tearfulness during her menstrual cycle. No suicidal or homicidal thoughts. She has a long history of migraine headaches but Imitrex as needed is effective and well-tolerated. Social History     Tobacco Use   Smoking Status Never Smoker   Smokeless Tobacco Never Used   Allergies:     Sporanox [itraconazole]    Objective:  /72   Pulse 70   Temp 98.1 °F (36.7 °C) (Oral)   Wt 134 lb (60.8 kg)   LMP 05/14/2015   SpO2 98%   BMI 25.46 kg/m²    No acute distress, heart regular rate and rhythm without murmur, lungs clear to auscultation easy effort, abdomen soft nondistended, no clubbing or cyanosis left knee shows tenderness over the distal tendon of the vastus lateralis with some swelling    Assessment:  1. Acute pain of left knee    2. Primary biliary cholangitis ([address]    3. Major depressive disorder, single episode, mild ([address]    4. Migraine without aura and without status migrainosus, not intractable            Plan:   Activity modification  Over-the-counter anti-inflammatories  Heat and ice  Continue other medications  She will continue to work with her gynecologist who recently noted an abnormality on her cervical exam  She may decide to try CBD oil since it was beneficial for her sister  Follow-up in 6 months or as needed  [facility] Family Handout\" provided  Avoid exposure to all tobacco products. Read and consider all information provided by the pharmacy regarding prescribed medications before use  Careful medical compliance  Proper diet and weight management   Otherwise continue current treatment plan  Call or return if symptoms are not well controlled  Go to ED if severe/significant symptoms occur    All medical conditions for this patient are stable unless otherwise indicated    David Ariza MD    This note was transcribed using a voice recognition software system. Proper technique and careful oversight were used to increase transcription accuracy but inadvertent errors may be present.

## 2020-02-10 ENCOUNTER — TELEPHONE (OUTPATIENT)
Dept: OBGYN CLINIC | Age: 35
End: 2020-02-10

## 2020-02-12 LAB
HPV COMMENT: NORMAL
HPV TYPE 16: NOT DETECTED
HPV TYPE 18: NOT DETECTED
HPVOH (OTHER TYPES): NOT DETECTED

## 2020-02-21 ENCOUNTER — TELEPHONE (OUTPATIENT)
Dept: FAMILY MEDICINE CLINIC | Age: 35
End: 2020-02-21

## 2020-02-21 RX ORDER — OSELTAMIVIR PHOSPHATE 75 MG/1
75 CAPSULE ORAL 2 TIMES DAILY
Qty: 10 CAPSULE | Refills: 0 | Status: SHIPPED | OUTPATIENT
Start: 2020-02-21 | End: 2020-02-26

## 2020-02-21 NOTE — TELEPHONE ENCOUNTER
Patient is positive for Influenza A. Recommend rest, fluids, acetaminophen or ibuprofen for fever/aches and tamiflu as directed.

## 2020-03-17 ENCOUNTER — OFFICE VISIT (OUTPATIENT)
Dept: FAMILY MEDICINE CLINIC | Age: 35
End: 2020-03-17
Payer: COMMERCIAL

## 2020-03-17 VITALS
DIASTOLIC BLOOD PRESSURE: 83 MMHG | WEIGHT: 134 LBS | BODY MASS INDEX: 25.46 KG/M2 | OXYGEN SATURATION: 100 % | HEART RATE: 66 BPM | SYSTOLIC BLOOD PRESSURE: 122 MMHG | TEMPERATURE: 98.9 F

## 2020-03-17 PROCEDURE — 96372 THER/PROPH/DIAG INJ SC/IM: CPT | Performed by: FAMILY MEDICINE

## 2020-03-17 PROCEDURE — 99213 OFFICE O/P EST LOW 20 MIN: CPT | Performed by: FAMILY MEDICINE

## 2020-03-17 RX ORDER — ONDANSETRON 4 MG/1
4 TABLET, ORALLY DISINTEGRATING ORAL EVERY 6 HOURS PRN
Qty: 12 TABLET | Refills: 1 | Status: SHIPPED | OUTPATIENT
Start: 2020-03-17 | End: 2020-03-22

## 2020-03-17 RX ORDER — KETOROLAC TROMETHAMINE 30 MG/ML
30 INJECTION, SOLUTION INTRAMUSCULAR; INTRAVENOUS ONCE
Status: COMPLETED | OUTPATIENT
Start: 2020-03-17 | End: 2020-03-17

## 2020-03-17 RX ADMIN — KETOROLAC TROMETHAMINE 30 MG: 30 INJECTION, SOLUTION INTRAMUSCULAR; INTRAVENOUS at 12:25

## 2020-03-17 NOTE — PROGRESS NOTES
Assessment and plan  1. Migraine without aura and without status migrainosus, not intractable  -Zofran PRN  - ketorolac (TORADOL) injection 30 mg    2. Upper respiratory infection, viral  Observe, request Z-Vaibhav if not resolved over the next 3 to 4 days    RTC prn or call if symptoms don't improve or resolve as discussed    Subjective  Patient presents reporting acute head congestion cough rhinorrhea. No myalgias. She had a fever the first day but it is not reoccurred. No significant travel history or lower respiratory symptoms. She is feeling better today. However her main concern is a 2-day history of persisting migraine. It is fairly typical for her. Ibuprofen has not helped. She is troubled with nausea but has not tried taking anything. She has a history of primary biliary cholangitis. Objective  /83   Pulse 66   Temp 98.9 °F (37.2 °C) (Oral)   Wt 134 lb (60.8 kg)   LMP 05/14/2015   SpO2 100%   BMI 25.46 kg/m²   Patient in no acute distress. Cranial nerves intact, strength and gait intact. Sclera and lids are clear. Throat:  pharynx irritated. Neck:  no significant masses               adenopathy - none significant  Lungs:  auscultation: clear                breath sounds are equal, no respiratory distress  Heart:  reg rhythm              no murmur      Faiza Crane MD    The note was completed using Dragon voice recognition transcription. Every effort was made to ensure accuracy; however, inadvertent  transcription errors may be present despite my best efforts to edit errors.

## 2020-03-20 ENCOUNTER — OFFICE VISIT (OUTPATIENT)
Dept: FAMILY MEDICINE CLINIC | Age: 35
End: 2020-03-20
Payer: COMMERCIAL

## 2020-03-20 VITALS
BODY MASS INDEX: 25.32 KG/M2 | TEMPERATURE: 98 F | HEART RATE: 66 BPM | SYSTOLIC BLOOD PRESSURE: 122 MMHG | WEIGHT: 133.25 LBS | DIASTOLIC BLOOD PRESSURE: 82 MMHG

## 2020-03-20 PROCEDURE — 99213 OFFICE O/P EST LOW 20 MIN: CPT | Performed by: NURSE PRACTITIONER

## 2020-03-20 ASSESSMENT — ENCOUNTER SYMPTOMS
RHINORRHEA: 0
SORE THROAT: 0
ABDOMINAL DISTENTION: 0
VOMITING: 0
ABDOMINAL PAIN: 1
CHEST TIGHTNESS: 0
NAUSEA: 0
DIARRHEA: 0
SHORTNESS OF BREATH: 0

## 2020-03-20 NOTE — PROGRESS NOTES
CHIEF COMPLAINT  Chief Complaint   Patient presents with    Abdominal Pain        HPI   Charli Moreau is a 37 y.o. female who presents to the office complaining of intermittent episodes of upper abdominal pain after eating. Patient reports \"sharp stabbing pains more often on the right upper quadrant after eating. \"  Patient unsure of any foods that aggravate associated symptoms. Patient reports \"feeling like gas then relieved with bowel movement. \"  Patient reports that Gas-X usually helps however last night it did not where she took Pepcid for the nausea and bloated sensation. Patient reports that it took a while for that to work but then she felt better. Patient reports that she is noticed bloating and pain after eating and is not always right after intake. Patient denies any dizziness or lightheadedness. No chest pain or shortness of breath. No nausea, vomiting, or diarrhea. No dysuria, hematuria, urinary urgency, frequency, retention. Patient has history of primary biliary cholangitis and reports having gallbladder removed a few years ago. Patient reports that she has had off-and-on symptoms with her stomach since then. Patient was seen 3 days ago by another provider for headache, congestion but reports symptoms have improved. Patient denies any otalgia, sore throat, or fever or chills. No other complaints, modifying factors or associated symptoms. Nursing notes reviewed.    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    Migraine     Migraine with aura, with intractable migraine, so stated, without mention of status migrainosus 3/3/2014    Primary biliary cholangitis ([address] 12/30/2019    Rash      Past Surgical History:   Procedure Laterality Date    BLADDER SURGERY      CHOLECYSTECTOMY, LAPAROSCOPIC N/A 06/28/2017    WITH Attends [employer] service: Not on file     Active member of club or organization: Not on file     Attends meetings of clubs or organizations: Not on file     Relationship status: Not on file    Intimate partner violence     Fear of current or ex partner: Not on file     Emotionally abused: Not on file     Physically abused: Not on file     Forced sexual activity: Not on file   Other Topics Concern    Not on file   Social History Narrative    Not on file     Current Outpatient Medications   Medication Sig Dispense Refill    ondansetron (ZOFRAN ODT) 4 MG disintegrating tablet Take 1 tablet by mouth every 6 hours as needed for Nausea 12 tablet 1    buPROPion (WELLBUTRIN XL) 300 MG extended release tablet Take 1 tablet by mouth daily 90 tablet 1    ursodiol (ACTIGALL) 300 MG capsule Take 300 mg by mouth 3 times daily (before meals)      albuterol sulfate HFA (VENTOLIN HFA) 108 (90 Base) MCG/ACT inhaler Inhale 2 puffs into the lungs every 6 hours as needed for Wheezing 1 Inhaler 0    SUMAtriptan (IMITREX) 50 MG tablet Take 1 tablet by mouth once as needed for Migraine 9 tablet 3     No current facility-administered medications for this visit. Allergies   Allergen Reactions    Sporanox [Itraconazole] Hives, Itching and Nausea Only       REVIEW OF SYSTEMS  Review of Systems   Constitutional: Negative for activity change, appetite change, chills and fever. HENT: Negative for congestion, rhinorrhea and sore throat. Eyes: Negative for visual disturbance. Respiratory: Negative for chest tightness and shortness of breath. Cardiovascular: Negative for chest pain. Gastrointestinal: Positive for abdominal pain. Negative for abdominal distention, diarrhea, nausea and vomiting. Genitourinary: Negative for dysuria, frequency, hematuria and urgency. Musculoskeletal: Negative for gait problem and myalgias. Skin: Negative for rash.    Neurological: Negative for dizziness, weakness, light-headedness and numbness. Psychiatric/Behavioral: Negative for sleep disturbance. PHYSICAL EXAM  /82   Pulse 66   Temp 98 °F (36.7 °C) (Oral)   Wt 133 lb 4 oz (60.4 kg)   LMP 05/14/2015   BMI 25.32 kg/m²   Physical Exam  Vitals signs reviewed. Constitutional:       General: She is not in acute distress. Appearance: Normal appearance. She is well-developed. She is not diaphoretic. HENT:      Head: Normocephalic and atraumatic. Right Ear: Tympanic membrane normal.      Left Ear: Tympanic membrane normal.      Nose: Nose normal.      Mouth/Throat:      Mouth: Mucous membranes are moist.      Pharynx: Oropharynx is clear. No oropharyngeal exudate or posterior oropharyngeal erythema. Eyes:      General: No scleral icterus. Right eye: No discharge. Left eye: No discharge. Pupils: Pupils are equal, round, and reactive to light. Neck:      Musculoskeletal: Normal range of motion and neck supple. Vascular: No JVD. Cardiovascular:      Rate and Rhythm: Normal rate and regular rhythm. Pulses: Normal pulses. Pulmonary:      Effort: Pulmonary effort is normal. No respiratory distress. Breath sounds: No stridor. No wheezing, rhonchi or rales. Chest:      Chest wall: No tenderness. Abdominal:      General: Bowel sounds are normal. There is no distension. Palpations: Abdomen is soft. Tenderness: There is generalized abdominal tenderness. There is no right CVA tenderness, left CVA tenderness or guarding. Musculoskeletal: Normal range of motion. General: No swelling or deformity. Skin:     General: Skin is warm and dry. Capillary Refill: Capillary refill takes less than 2 seconds. Coloration: Skin is not pale. Findings: No bruising, lesion or rash. Neurological:      General: No focal deficit present. Mental Status: She is alert and oriented to person, place, and time.       Gait: Gait normal.   Psychiatric:         Mood and Affect: Mood normal.         Behavior: Behavior normal.         ASSESSMENT/PLAN:   1. Abdominal bloating  Patient reports ongoing abdominal pain with bloating after eating. Patient reports that symptoms can happen immediately after eating or long after. Patient reports \"gas-like feeling. \"  Patient also reports \"sharp stabbing pains. \"  Patient reports symptoms are relieved after bowel movement or taking Gas-X. Patient reports that she used to be on a probiotic but does not take it on a daily basis. I did instruct patient on taking probiotic as well as keeping a food diary  on what foods causes symptoms. I did instruct patient on refraining from dairy and possibly gluten initially to see if that would help with symptoms. Patient aware that it take a few weeks for her system to adjust from removing food from daily diet to see if that is the aggravating factor. Patient was instructed to follow-up if symptoms do not improve that patient may be referred to GI. Patient reports that she does have history of primary biliary cholangitis and has had issues since having her gallbladder removed. Patient currently denies any vomiting, diarrhea, or constipation. 2. Pain of upper abdomen  Intermittent abdominal pain associated with bloating. Patient reports that symptoms started after eating. Patient reports that it feels more like gas pains and is relieved after having bowel movements or taking Gas-X. Patient instructed on keeping food diary to see what foods aggravate bloating and abdominal pain. I did discuss removing certain foods from diet such as dairy and possibly gluten to see if that will help. Patient instructed to take daily probiotic and follow-up as needed. Patient instructed for any new or worsening symptoms to follow-up sooner. Patient verbalizes and acknowledges with plan of care at this time. The note was completed using Dragon voice recognition transcription.  Every effort was made to

## 2020-03-20 NOTE — PATIENT INSTRUCTIONS
Please read the healthy family handout that you were given and share it with your family. Please compare this printed medication list with your medications at home to be sure they are the same. If you have any medications that are different please contact us immediately at [phone]. Also review your allergies that we have listed, these may also include medications that you have not been able to tolerate, make sure everything listed is correct. If you have any allergies that are different please contact us immediately at [phone].

## 2020-03-23 ENCOUNTER — NURSE ONLY (OUTPATIENT)
Dept: FAMILY MEDICINE CLINIC | Age: 35
End: 2020-03-23

## 2020-03-23 LAB
CHOLESTEROL, TOTAL: 180 MG/DL (ref 0–199)
GLUCOSE BLD-MCNC: 90 MG/DL (ref 70–99)
HDLC SERPL-MCNC: 61 MG/DL (ref 40–60)
LDL CHOLESTEROL CALCULATED: 95 MG/DL
TRIGL SERPL-MCNC: 119 MG/DL (ref 0–150)

## 2020-03-27 LAB
3-OH-COTININE: <2 NG/ML
COTININE: <2 NG/ML
NICOTINE: <2 NG/ML

## 2020-03-30 RX ORDER — SUMATRIPTAN 50 MG/1
50 TABLET, FILM COATED ORAL
Qty: 9 TABLET | Refills: 3 | Status: SHIPPED | OUTPATIENT
Start: 2020-03-30 | End: 2020-03-30 | Stop reason: SDUPTHER

## 2020-03-30 RX ORDER — SUMATRIPTAN 50 MG/1
TABLET, FILM COATED ORAL
Qty: 9 TABLET | Refills: 3 | Status: SHIPPED | OUTPATIENT
Start: 2020-03-30 | End: 2021-08-24 | Stop reason: SDUPTHER

## 2020-04-02 ENCOUNTER — NURSE ONLY (OUTPATIENT)
Dept: FAMILY MEDICINE CLINIC | Age: 35
End: 2020-04-02
Payer: COMMERCIAL

## 2020-04-02 PROCEDURE — 96372 THER/PROPH/DIAG INJ SC/IM: CPT | Performed by: NURSE PRACTITIONER

## 2020-04-02 RX ORDER — KETOROLAC TROMETHAMINE 30 MG/ML
30 INJECTION, SOLUTION INTRAMUSCULAR; INTRAVENOUS ONCE
Status: COMPLETED | OUTPATIENT
Start: 2020-04-02 | End: 2020-04-02

## 2020-04-02 RX ADMIN — KETOROLAC TROMETHAMINE 30 MG: 30 INJECTION, SOLUTION INTRAMUSCULAR; INTRAVENOUS at 13:05

## 2020-04-10 ENCOUNTER — OFFICE VISIT (OUTPATIENT)
Dept: FAMILY MEDICINE CLINIC | Age: 35
End: 2020-04-10
Payer: COMMERCIAL

## 2020-04-10 VITALS
DIASTOLIC BLOOD PRESSURE: 73 MMHG | TEMPERATURE: 98 F | OXYGEN SATURATION: 99 % | HEART RATE: 67 BPM | SYSTOLIC BLOOD PRESSURE: 116 MMHG

## 2020-04-10 PROBLEM — K21.9 GASTROESOPHAGEAL REFLUX DISEASE WITHOUT ESOPHAGITIS: Status: ACTIVE | Noted: 2020-04-10

## 2020-04-10 PROCEDURE — 99214 OFFICE O/P EST MOD 30 MIN: CPT | Performed by: FAMILY MEDICINE

## 2020-04-10 RX ORDER — OMEPRAZOLE 40 MG/1
40 CAPSULE, DELAYED RELEASE ORAL
Qty: 30 CAPSULE | Refills: 5 | Status: SHIPPED | OUTPATIENT
Start: 2020-04-10 | End: 2020-10-26

## 2020-04-10 NOTE — PROGRESS NOTES
Subjective:  Vladimir Sin is here to discuss the following issues. She has abdominal distention and discomfort with nausea frequently after eating. Bowel movements may improve this. High acid foods such as tomatoes and spicy foods make it worse. No vomiting no diarrhea. No constipation. She has a history of GERD but has no dysphasia. She is on no related medications. He continues on medication for primary biliary cholangitis the above-mentioned symptoms began before starting Actigall. She continues to follow-up with her specialist.  She has 2 days of left lower abdominal tenderness and pain. No other related symptoms. No trauma. No redness warmth fever sweats or chills  Social History     Tobacco Use   Smoking Status Never Smoker   Smokeless Tobacco Never Used   Allergies:     Sporanox [itraconazole]    Objective:  /73   Pulse 67   Temp 98 °F (36.7 °C) (Oral)   LMP 05/14/2015   SpO2 99%    No acute distress heart regular rate and rhythm lungs clear auscultation abdomen soft tender in the left lower quadrant to shallow palpation no redness warmth or swelling    Assessment:  1. Dyspepsia    2. Gastroesophageal reflux disease without esophagitis    3. Primary biliary cholangitis (HCC)    4. Strain of abdominal muscle, initial encounter            Plan:  Prilosec  Low acid diet  Continue Actigall  Activity modification  Update me in 1 week and follow-up in 4 months or as needed  \"Healthy Family Handout\" provided  Avoid exposure to all tobacco products.   Read and consider all information provided by the pharmacy regarding prescribed medications before use  Careful medical compliance  Proper diet and weight management   Otherwise continue current treatment plan  Call or return if symptoms are not well controlled  Go to ED if severe/significant symptoms occur    All medical conditions for this patient are stable unless otherwise indicated    Duwaine Carrel MD    This note was transcribed using a

## 2020-05-05 ENCOUNTER — OFFICE VISIT (OUTPATIENT)
Dept: FAMILY MEDICINE CLINIC | Age: 35
End: 2020-05-05
Payer: COMMERCIAL

## 2020-05-05 VITALS — HEART RATE: 65 BPM | SYSTOLIC BLOOD PRESSURE: 122 MMHG | DIASTOLIC BLOOD PRESSURE: 82 MMHG

## 2020-05-05 PROCEDURE — 99213 OFFICE O/P EST LOW 20 MIN: CPT | Performed by: NURSE PRACTITIONER

## 2020-05-05 ASSESSMENT — ENCOUNTER SYMPTOMS
COUGH: 0
WHEEZING: 0
RHINORRHEA: 0
VOMITING: 0
ABDOMINAL PAIN: 0
NAUSEA: 0
ABDOMINAL DISTENTION: 0
SORE THROAT: 0
CHEST TIGHTNESS: 0
DIARRHEA: 0
SHORTNESS OF BREATH: 0

## 2020-05-05 NOTE — PROGRESS NOTES
Sister         vision loss    Hypertension Maternal Grandfather         vision loss    Heart Failure Maternal Grandfather     Rheum Arthritis Paternal Grandmother     Cancer Paternal Grandmother         lung caner    Diabetes Neg Hx     Emphysema Neg Hx      Social History     Socioeconomic History    Marital status:      Spouse name: Not on file    Number of children: Not on file    Years of education: Not on file    Highest education level: Not on file   Occupational History    Not on file   Social Needs    Financial resource strain: Not on file    Food insecurity     Worry: Not on file     Inability: Not on file    Transportation needs     Medical: Not on file     Non-medical: Not on file   Tobacco Use    Smoking status: Never Smoker    Smokeless tobacco: Never Used   Substance and Sexual Activity    Alcohol use:  Yes     Alcohol/week: 2.0 standard drinks     Types: 1 Glasses of wine, 1 Shots of liquor per week     Frequency: 2-4 times a month     Drinks per session: 3 or 4     Binge frequency: Never     Comment: occassional    Drug use: No    Sexual activity: Yes     Partners: Male   Lifestyle    Physical activity     Days per week: Not on file     Minutes per session: Not on file    Stress: Not on file   Relationships    Social connections     Talks on phone: Not on file     Gets together: Not on file     Attends Scientologist service: Not on file     Active member of club or organization: Not on file     Attends meetings of clubs or organizations: Not on file     Relationship status: Not on file    Intimate partner violence     Fear of current or ex partner: Not on file     Emotionally abused: Not on file     Physically abused: Not on file     Forced sexual activity: Not on file   Other Topics Concern    Not on file   Social History Narrative    Not on file     Current Outpatient Medications   Medication Sig Dispense Refill    omeprazole (PRILOSEC) 40 MG delayed release capsule Take performing weighted squats are new but her knees pop all the time. Patient was instructed on limiting weights with squats and wearing a knee brace. Patient instructed to take ibuprofen or Tylenol as needed for discomfort. Patient educated on proper body mechanics with squatting and working out. Patient verbalized and acknowledges. Follow-up as needed. The note was completed using Dragon voice recognition transcription. Every effort was made to ensure accuracy; however, inadvertent  transcription errors may be present despite my best efforts to edit errors.     Tara Whatley, VLAD - CNP

## 2020-05-27 RX ORDER — ALBUTEROL SULFATE 90 UG/1
2 AEROSOL, METERED RESPIRATORY (INHALATION) EVERY 6 HOURS PRN
Qty: 1 INHALER | Refills: 1 | Status: SHIPPED | OUTPATIENT
Start: 2020-05-27 | End: 2022-09-15 | Stop reason: SDUPTHER

## 2020-08-05 RX ORDER — BUPROPION HYDROCHLORIDE 300 MG/1
300 TABLET ORAL DAILY
Qty: 90 TABLET | Refills: 0 | Status: SHIPPED | OUTPATIENT
Start: 2020-08-05 | End: 2020-11-02

## 2020-10-26 RX ORDER — OMEPRAZOLE 40 MG/1
CAPSULE, DELAYED RELEASE ORAL
Qty: 30 CAPSULE | Refills: 5 | Status: SHIPPED | OUTPATIENT
Start: 2020-10-26 | End: 2021-02-12

## 2020-10-26 NOTE — TELEPHONE ENCOUNTER
Future appt scheduled not scheduled  Last appt 4/10  Refilled medication per verbal order from provider.

## 2020-10-27 ENCOUNTER — PATIENT MESSAGE (OUTPATIENT)
Dept: FAMILY MEDICINE CLINIC | Age: 35
End: 2020-10-27

## 2020-11-02 RX ORDER — BUPROPION HYDROCHLORIDE 300 MG/1
TABLET ORAL
Qty: 90 TABLET | Refills: 3 | Status: SHIPPED | OUTPATIENT
Start: 2020-11-02 | End: 2021-12-20

## 2020-11-04 ENCOUNTER — E-VISIT (OUTPATIENT)
Dept: FAMILY MEDICINE CLINIC | Age: 35
End: 2020-11-04
Payer: COMMERCIAL

## 2020-11-04 PROCEDURE — 98970 NQHP OL DIG ASSMT&MGMT 5-10: CPT | Performed by: FAMILY MEDICINE

## 2020-11-04 RX ORDER — NITROFURANTOIN 25; 75 MG/1; MG/1
100 CAPSULE ORAL 2 TIMES DAILY
Qty: 14 CAPSULE | Refills: 0 | Status: SHIPPED | OUTPATIENT
Start: 2020-11-04 | End: 2020-11-11

## 2021-02-12 ENCOUNTER — VIRTUAL VISIT (OUTPATIENT)
Dept: FAMILY MEDICINE CLINIC | Age: 36
End: 2021-02-12
Payer: COMMERCIAL

## 2021-02-12 DIAGNOSIS — K74.3 PRIMARY BILIARY CHOLANGITIS (HCC): Primary | ICD-10-CM

## 2021-02-12 DIAGNOSIS — F32.0 MAJOR DEPRESSIVE DISORDER, SINGLE EPISODE, MILD (HCC): ICD-10-CM

## 2021-02-12 DIAGNOSIS — E16.2 HYPOGLYCEMIA: ICD-10-CM

## 2021-02-12 DIAGNOSIS — K21.9 GASTROESOPHAGEAL REFLUX DISEASE WITHOUT ESOPHAGITIS: ICD-10-CM

## 2021-02-12 DIAGNOSIS — G25.0 TREMOR, ESSENTIAL: ICD-10-CM

## 2021-02-12 PROCEDURE — 99214 OFFICE O/P EST MOD 30 MIN: CPT | Performed by: FAMILY MEDICINE

## 2021-02-12 RX ORDER — ESOMEPRAZOLE MAGNESIUM 40 MG/1
40 CAPSULE, DELAYED RELEASE ORAL
Qty: 30 CAPSULE | Refills: 5 | Status: SHIPPED | OUTPATIENT
Start: 2021-02-12 | End: 2021-08-31

## 2021-02-12 NOTE — PATIENT INSTRUCTIONS
Please read the healthy family handout that you were given and share it with your family. Please compare this printed medication list with your medications at home to be sure they are the same. If you have any medications that are different please contact us immediately at [phone]. Also review your allergies that we have listed, these may also include medications that you have not been able to tolerate, make sure everything listed is correct. If you have any allergies that are different please contact us immediately at [phone].

## 2021-02-12 NOTE — PROGRESS NOTES
2021    TELEHEALTH EVALUATION -- Audio/Visual (During [facility]- public health emergency)      History:    Esmer Basilio (:  1985) has requested an audio/video evaluation for the following concern(s): She has GERD and has some mid chest burning and throat clearing on omeprazole. She tries to follow an appropriate diet. She has depression but on her medication emotionally feels very well. She has a history of primary biliary cholangitis but on an increased dose to medication for her GI doctor her symptoms are improved. She reports a fine tremor in her hands occasionally. This is very mild. She has episodes of hypoglycemia that tend to occur in mid afternoon. No syncope. No chest pain or chest pressure or palpitations. No nausea vomiting or diarrhea. No fever sweats or chills    Prior to Visit Medications    Medication Sig Taking? Authorizing Provider   esomeprazole (NEXIUM) 40 MG delayed release capsule Take 1 capsule by mouth every morning (before breakfast) Yes José Luis Dong MD   buPROPion (WELLBUTRIN XL) 300 MG extended release tablet TAKE ONE TABLET BY MOUTH DAILY Yes José Luis Dong MD   SUMAtriptan (IMITREX) 50 MG tablet Take 1 tablet by mouth once as needed for migraine Yes José Luis Dong MD   ursodiol (ACTIGALL) 300 MG capsule Take 300 mg by mouth 3 times daily (before meals) Yes Historical Provider, MD   albuterol sulfate HFA (VENTOLIN HFA) 108 (90 Base) MCG/ACT inhaler Inhale 2 puffs into the lungs every 6 hours as needed for Wheezing  Patient not taking: Reported on 2021  José Luis Dong MD       Social History     Tobacco Use    Smoking status: Never Smoker    Smokeless tobacco: Never Used   Substance Use Topics    Alcohol use: Yes     Alcohol/week: 2.0 standard drinks     Types: 1 Glasses of wine, 1 Shots of liquor per week     Frequency: 2-4 times a month     Drinks per session: 3 or 4     Binge frequency: Never     Comment: occassional    Drug use:  No PHYSICAL EXAMINATION:    Vital Signs: (As obtained by patient/caregiver or practitioner observation)    Blood pressure:              Heart rate:        Respiratory rate:        Pulse oximetry:     Constitutional:   Appears well-developed and well-nourished   No apparent distress                          Mental status:  Alert and awake   Oriented to person/place/time   Able to follow commands      Psychiatric:              mood and affect are normal               speech and thought processes are intact               appearance and behavior are unremarkable  Eyes:  EOM are intact  Sclera, lids and lashes are clear                 No discharge noted    HENT:   Normocephalic, atraumatic. Mouth/Throat: Mucous membranes are moist.     External Ears:  Normal      Neck:   No visualized mass     Pulmonary/Chest:   Respiratory effort normal, no visualized signs of difficulty breathing or respiratory distress           Musculoskeletal:     Normal gait with no signs of ataxia    Normal range of motion of neck          Neurological:          No Facial Asymmetry                            No gaze palsy                Skin:   No significant exanthematous lesions or discoloration noted on facial skin        Examination of this patient is limited due to the visit being performed by audio/video format. [id number]    ASSESSMENT and PLAN    Encounter Diagnoses   Name Primary? 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   All medical conditions for this patient are stable unless otherwise indicated    Sasha Graf MD    This note was transcribed using a voice recognition software system. Proper technique and careful oversight were used to increase transcription accuracy but inadvertent errors may be present.

## 2021-04-09 ENCOUNTER — TELEPHONE (OUTPATIENT)
Dept: FAMILY MEDICINE CLINIC | Age: 36
End: 2021-04-09

## 2021-04-09 RX ORDER — BUPROPION HYDROCHLORIDE 150 MG/1
150 TABLET ORAL DAILY
Qty: 90 TABLET | Refills: 1 | Status: SHIPPED
Start: 2021-04-09 | End: 2021-12-20 | Stop reason: DRUGHIGH

## 2021-04-09 NOTE — TELEPHONE ENCOUNTER
Noted   Yes, the wellbutrin can be increased to 450mg daily but unfortunately it no longer comes in that dose so please add xl 150 qd 30 plus 5 and continue xl 300 qd. Yes, if not helping she can decrease back to 300 without titrating. If not helping also let us know.

## 2021-04-09 NOTE — TELEPHONE ENCOUNTER
Patient is wanting to know if her Wellbutrin can be increased. She is very stressed right now with moving and getting things back to normal. She is tearful and worn out. Also wanted to know that if the dose increase doesn't work if she could just start back to on the current dose without having to titrate down.

## 2021-04-21 ENCOUNTER — OFFICE VISIT (OUTPATIENT)
Dept: GASTROENTEROLOGY | Age: 36
End: 2021-04-21
Payer: COMMERCIAL

## 2021-04-21 VITALS
HEIGHT: 61 IN | SYSTOLIC BLOOD PRESSURE: 118 MMHG | BODY MASS INDEX: 25.22 KG/M2 | DIASTOLIC BLOOD PRESSURE: 66 MMHG | WEIGHT: 133.6 LBS | HEART RATE: 74 BPM

## 2021-04-21 DIAGNOSIS — R10.9 LEFT SIDED ABDOMINAL PAIN: ICD-10-CM

## 2021-04-21 DIAGNOSIS — K74.3 PRIMARY BILIARY CHOLANGITIS (HCC): Primary | ICD-10-CM

## 2021-04-21 DIAGNOSIS — R10.32 LEFT LOWER QUADRANT ABDOMINAL PAIN: ICD-10-CM

## 2021-04-21 DIAGNOSIS — R74.8 ABNORMAL LIVER ENZYMES: ICD-10-CM

## 2021-04-21 PROCEDURE — 99214 OFFICE O/P EST MOD 30 MIN: CPT | Performed by: INTERNAL MEDICINE

## 2021-04-21 NOTE — PROGRESS NOTES
[place] ,  [address] [facility]  Phone: [phone]    CHIEF COMPLAINT     Chief Complaint   Patient presents with    Abdominal Pain     getting worse this past month        HPI     John Dumont is a 38 y.o. female who presents for followup for abdominal pain. Last seen 10/2019. She has had chronic issues with elevated alkaline phosphatase. She has had chronic liver enzyme elevations going back at least to 2015 based on labs in [facility]. She had a cholecystectomy in 2017 with Dr Devra Gowers and at that time she also had a liver biopsy (see detailed result below) - \"patchy mild lymphoplasmacytic portal inflammation  and portal granulomas, portal fibrosis\". PBC and AIH overlap syndrome was a possibility  Positive AMA. Was referred to Denzel Fleming for this for Hepatology expertise. Office note 2/2021 was reviewed. Current diagnosis based on notes from Dr. Carley Fleming is PBC - primary biliary cholangitis and she is on ursodiol 600 mg twice daily and per their notes her liver numbers were improved and she will be following up with them    Her main complaint for visit today is abdominal pain, this is present over the left side of the abdomen, often starts left UQ and will radiate down to the left LQ and suprapubic region, lasts several hours, intermittent, not related to food or bowel movements, pain is severe, almost made her go to ED. Denies n/v. Denies rectal bleeding. Has regular bowel movements, does not feel constipated. One grandparent with colon cancer but no family history of colon cancer in first degree family members.     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    Migraine     Migraine with aura, with intractable migraine, so stated, without mention of status migrainosus 3/3/2014    Primary biliary cholangitis ([facility] [address] 12/30/2019    Rash      FAMILY HISTORY     Family History   Problem Relation Age of Onset    High Cholesterol Father     Hypertension Father     Other Father         vision loss    Colon Cancer Maternal Grandmother     Cancer Maternal Grandmother         colon cancer,pelvic cancer, lung cancer    Hypertension Maternal Grandmother         vision loss    Migraines Mother     Asthma Sister         vision loss    Hypertension Maternal Grandfather         vision loss    Heart Failure Maternal Grandfather     Rheum Arthritis Paternal Grandmother     Cancer Paternal Grandmother         lung caner    Diabetes Neg Hx     Emphysema Neg Hx      SOCIAL HISTORY     Social History     Socioeconomic History    Marital status:      Spouse name: Not on file    Number of children: Not on file    Years of education: Not on file    Highest education level: Not on file   Occupational History    Not on file   Social Needs    Financial resource strain: Not on file    Food insecurity     Worry: Not on file     Inability: Not on file    Transportation needs     Medical: Not on file     Non-medical: Not on file   Tobacco Use    Smoking status: Never Smoker    Smokeless tobacco: Never Used   Substance and Sexual Activity    Alcohol use:  Yes     Alcohol/week: 2.0 standard drinks     Types: 1 Glasses of wine, 1 Shots of liquor per week     Frequency: 2-4 times a month     Drinks per session: 3 or 4     Binge frequency: Never     Comment: occassional    Drug use: No    Sexual activity: Yes     Partners: Male   Lifestyle    Physical activity     Days per week: Not on file     Minutes per session: Not on file    Stress: Not on file   Relationships    Social connections     Talks on phone: Not on file     Gets together: Not on file     Attends [employer]odoxy service: Not on file     Active member of 11/01/2016    Influenza Virus Vaccine 10/15/2013, 10/31/2014, 10/20/2017, 10/19/2018, 10/16/2019    Tetanus 06/10/2016       REVIEW OF SYSTEMS     Constitutional: denies fever, weight change if present has been documented in HPI   HENT: Negative for ear pain, hearing loss and nosebleeds. Eyes: Negative for pain and visual disturbance. Respiratory: Negative for cough, shortness of breath and wheezing. Cardiovascular: Negative for chest pain, palpitations and leg swelling. Gastrointestinal: see HPI for details. Musculoskeletal: Positive for arthralgias and back pain. Skin: Negative for pallor and rash. Hematological: Negative for adenopathy. Does not bruise/bleed easily. Psychiatric/Behavioral: Negative for agitation, confusion, hallucinations. PHYSICAL EXAM   VITAL SIGNS: /66 (Site: Right Wrist, Position: Sitting, Cuff Size: Medium Adult)   Pulse 74   Ht 5' 1\" (1.549 m)   Wt 133 lb 9.6 oz (60.6 kg)   LMP 05/14/2015   BMI 25.24 kg/m²   Wt Readings from Last 3 Encounters:   04/21/21 133 lb 9.6 oz (60.6 kg)   03/20/20 133 lb 4 oz (60.4 kg)   03/17/20 134 lb (60.8 kg)     Gen: AAO3, NAD  HEENT: no pallor or icterus  RS: clear to auscultation bilaterally  CVS: S1S2 RRR, no murmurs  GI: soft, nontender, not distended, BS+, no hepatosplenomegaly  Ext: no edema or clubbing    US LIVER  Narrative: EXAMINATION:  RIGHT UPPER QUADRANT ULTRASOUND    10/28/2019 8:07 am    COMPARISON:  CT abdomen October 24, 2017. HISTORY:  ORDERING SYSTEM PROVIDED HISTORY: Abnormal liver enzymes  TECHNOLOGIST PROVIDED HISTORY:  Reason for exam:->elevated liver enzymes, r/o focal liver lesions    FINDINGS:  LIVER:  The liver demonstrates normal echogenicity without evidence of  intrahepatic biliary ductal dilatation. BILIARY SYSTEM:  Post cholecystectomy changes. No abnormal fluid collection. Common bile duct is within normal limits measuring 3 mm.     RIGHT KIDNEY: The right kidney is grossly unremarkable without evidence of  hydronephrosis. The right kidney was incompletely evaluated. PANCREAS:  Visualized portions of the pancreas are unremarkable. The  pancreas was not optimally visualized. OTHER: No evidence of right upper quadrant ascites. Impression: Post cholecystectomy changes. No acute abnormality.        FINAL IMPRESSION     Left-sided abdominal pain, etiology is not clear, not related to food or bowel movements  Will get labs and a CT scan of the abdomen and pelvis    Elevated alkaline phosphatase, primary biliary cholangitis, following up with Mame Finn and is on ursodiol 600 mg twice daily at this time  She has been asked to continue follow-up with them, repeat LFTs have been ordered for visit today

## 2021-04-23 ENCOUNTER — HOSPITAL ENCOUNTER (EMERGENCY)
Age: 36
Discharge: HOME | End: 2021-04-23
Payer: COMMERCIAL

## 2021-04-23 ENCOUNTER — APPOINTMENT (OUTPATIENT)
Dept: CT IMAGING | Age: 36
End: 2021-04-23
Payer: COMMERCIAL

## 2021-04-23 VITALS
OXYGEN SATURATION: 100 % | SYSTOLIC BLOOD PRESSURE: 109 MMHG | WEIGHT: 133 LBS | HEIGHT: 64 IN | HEART RATE: 79 BPM | TEMPERATURE: 98.3 F | RESPIRATION RATE: 16 BRPM | BODY MASS INDEX: 22.71 KG/M2 | DIASTOLIC BLOOD PRESSURE: 71 MMHG

## 2021-04-23 DIAGNOSIS — R10.12 ABDOMINAL PAIN, LEFT UPPER QUADRANT: Primary | ICD-10-CM

## 2021-04-23 DIAGNOSIS — R74.8 ELEVATED ALKALINE PHOSPHATASE LEVEL: ICD-10-CM

## 2021-04-23 LAB
A/G RATIO: 1.3 (ref 1.1–2.2)
ALBUMIN SERPL-MCNC: 4.5 G/DL (ref 3.4–5)
ALP BLD-CCNC: 168 U/L (ref 40–129)
ALT SERPL-CCNC: 40 U/L (ref 10–40)
ANION GAP SERPL CALCULATED.3IONS-SCNC: 10 MMOL/L (ref 3–16)
AST SERPL-CCNC: 26 U/L (ref 15–37)
BASOPHILS ABSOLUTE: 0 K/UL (ref 0–0.2)
BASOPHILS RELATIVE PERCENT: 0.6 %
BILIRUB SERPL-MCNC: 0.3 MG/DL (ref 0–1)
BILIRUBIN URINE: NEGATIVE
BLOOD, URINE: NEGATIVE
BUN BLDV-MCNC: 15 MG/DL (ref 7–20)
CALCIUM SERPL-MCNC: 9.6 MG/DL (ref 8.3–10.6)
CHLORIDE BLD-SCNC: 101 MMOL/L (ref 99–110)
CLARITY: CLEAR
CO2: 27 MMOL/L (ref 21–32)
COLOR: YELLOW
CREAT SERPL-MCNC: 0.7 MG/DL (ref 0.6–1.1)
EOSINOPHILS ABSOLUTE: 0.1 K/UL (ref 0–0.6)
EOSINOPHILS RELATIVE PERCENT: 1.3 %
GFR AFRICAN AMERICAN: >60
GFR NON-AFRICAN AMERICAN: >60
GLOBULIN: 3.4 G/DL
GLUCOSE BLD-MCNC: 88 MG/DL (ref 70–99)
GLUCOSE URINE: NEGATIVE MG/DL
HCT VFR BLD CALC: 39.8 % (ref 36–48)
HEMOGLOBIN: 13.4 G/DL (ref 12–16)
KETONES, URINE: NEGATIVE MG/DL
LEUKOCYTE ESTERASE, URINE: NEGATIVE
LIPASE: 48 U/L (ref 13–60)
LYMPHOCYTES ABSOLUTE: 2.1 K/UL (ref 1–5.1)
LYMPHOCYTES RELATIVE PERCENT: 31.9 %
MCH RBC QN AUTO: 30.4 PG (ref 26–34)
MCHC RBC AUTO-ENTMCNC: 33.7 G/DL (ref 31–36)
MCV RBC AUTO: 90.2 FL (ref 80–100)
MICROSCOPIC EXAMINATION: NORMAL
MONOCYTES ABSOLUTE: 0.5 K/UL (ref 0–1.3)
MONOCYTES RELATIVE PERCENT: 7 %
NEUTROPHILS ABSOLUTE: 3.9 K/UL (ref 1.7–7.7)
NEUTROPHILS RELATIVE PERCENT: 59.2 %
NITRITE, URINE: NEGATIVE
PDW BLD-RTO: 12.3 % (ref 12.4–15.4)
PH UA: 6 (ref 5–8)
PLATELET # BLD: 296 K/UL (ref 135–450)
PMV BLD AUTO: 8.5 FL (ref 5–10.5)
POTASSIUM SERPL-SCNC: 3.7 MMOL/L (ref 3.5–5.1)
PROTEIN UA: NEGATIVE MG/DL
RBC # BLD: 4.41 M/UL (ref 4–5.2)
SODIUM BLD-SCNC: 138 MMOL/L (ref 136–145)
SPECIFIC GRAVITY UA: 1.02 (ref 1–1.03)
TOTAL PROTEIN: 7.9 G/DL (ref 6.4–8.2)
URINE TYPE: NORMAL
UROBILINOGEN, URINE: 0.2 E.U./DL
WBC # BLD: 6.7 K/UL (ref 4–11)

## 2021-04-23 PROCEDURE — 74177 CT ABD & PELVIS W/CONTRAST: CPT

## 2021-04-23 PROCEDURE — 80053 COMPREHEN METABOLIC PANEL: CPT

## 2021-04-23 PROCEDURE — 83690 ASSAY OF LIPASE: CPT

## 2021-04-23 PROCEDURE — 85025 COMPLETE CBC W/AUTO DIFF WBC: CPT

## 2021-04-23 PROCEDURE — 81003 URINALYSIS AUTO W/O SCOPE: CPT

## 2021-04-23 PROCEDURE — 6360000004 HC RX CONTRAST MEDICATION: Performed by: NURSE PRACTITIONER

## 2021-04-23 PROCEDURE — 2580000003 HC RX 258: Performed by: NURSE PRACTITIONER

## 2021-04-23 PROCEDURE — 99284 EMERGENCY DEPT VISIT MOD MDM: CPT

## 2021-04-23 RX ORDER — 0.9 % SODIUM CHLORIDE 0.9 %
1000 INTRAVENOUS SOLUTION INTRAVENOUS ONCE
Status: COMPLETED | OUTPATIENT
Start: 2021-04-23 | End: 2021-04-23

## 2021-04-23 RX ADMIN — IOPAMIDOL 75 ML: 755 INJECTION, SOLUTION INTRAVENOUS at 15:04

## 2021-04-23 RX ADMIN — SODIUM CHLORIDE 1000 ML: 9 INJECTION, SOLUTION INTRAVENOUS at 13:51

## 2021-04-23 ASSESSMENT — PAIN DESCRIPTION - PAIN TYPE: TYPE: ACUTE PAIN

## 2021-04-23 NOTE — LETTER
[facility]  ED  [address]  [address]  Phone: [phone]  Fax: [phone]             April 23, 2021    Patient: Deborah Arnold   YOB: 1985   Date of Visit: 4/23/2021       To Whom It May Concern:    Mahad Bradley was seen and treated in our emergency department on 4/23/2021.  She may return to work Monday 4/26/2021      Sincerely,     Registered Nurse        Signature:__________________________________

## 2021-04-23 NOTE — ED PROVIDER NOTES
[facility]  ED  EMERGENCY DEPARTMENT ENCOUNTER        Pt Name: Lauren Toney  MRN: [id number]  [place] 1985  Date of evaluation: 4/23/2021  Provider: VLAD Gibbs - CNP  PCP: Kim Larkin MD    GIUSEPPE. I have evaluated this patient. My supervising physician was available for consultation. CHIEF COMPLAINT       Chief Complaint   Patient presents with    Abdominal Pain     for 2 months. seen by GI for same and ordered outpt CT scan (not completed yet)       HISTORY OF PRESENT ILLNESS   (Location, Timing/Onset, Context/Setting, Quality, Duration, Modifying Factors, Severity, Associated Signs and Symptoms)  Note limiting factors. Lauren Toney is a 38 y.o. female with medical h/o asthma, pression, GERD, HSV, migraines, bili care cholangitis with cholecystectomy, hysterectomy presents the ED with complaints of left upper quadrant and left lower quadrant abdominal pain that is been present for the past few months. Patient does note this pain has been more constant over the past week. Notes that it does seem worse after eating spicy foods. She thought initially it was her GERD related to this symptom. She denies any alleviating factors. She does note some urinary pattern changes where it feels like whenever she urinates she is not able to completely empty her bladder. She also feels like she needs to sit longer and change positions to be able to fully drain her bladder. Denies taking any over-the-counter or prescription medication for the symptoms. Denies any recent EGD or colonoscopy. She does note to having some loose bowel movements, although she has had a change in her bowel patterns since having her cholecystectomy done. She denies any associated chest pain, nausea, vomiting, constipation, wheezing, fevers, rashes, short of air, cough, headache, neck pain, back pain, dysuria, hematuria, vaginal discharge, lightheaded or dizzy, syncope.   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    Migraine     Migraine with aura, with intractable migraine, so stated, without mention of status migrainosus 3/3/2014    Primary biliary cholangitis ([facility] [address] 12/30/2019    Rash          SURGICAL HISTORY     Past Surgical History:   Procedure Laterality Date    BLADDER SURGERY      CHOLECYSTECTOMY, LAPAROSCOPIC N/A 06/28/2017    WITH CHOLANGIOGRAMS; LAPAROSCOPIC LIVER BIOPSY    HYSTERECTOMY Bilateral 5/18/15    SUBTOTAL HYSTERECTOMY,BILATERAL SALPINGECTOMY,ABDOMINAL SACRO-COLPOSUSPENSION,CYSTOSCOPY    LAPAROSCOPY      LEEP  11/29/2016    WISDOM TOOTH EXTRACTION  1998         CURRENTMEDICATIONS       Previous Medications    ALBUTEROL SULFATE HFA (VENTOLIN HFA) 108 (90 BASE) MCG/ACT INHALER    Inhale 2 puffs into the lungs every 6 hours as needed for Wheezing    BUPROPION (WELLBUTRIN XL) 150 MG EXTENDED RELEASE TABLET    Take 1 tablet by mouth daily    BUPROPION (WELLBUTRIN XL) 300 MG EXTENDED RELEASE TABLET    TAKE ONE TABLET BY MOUTH DAILY    ESOMEPRAZOLE (NEXIUM) 40 MG DELAYED RELEASE CAPSULE    Take 1 capsule by mouth every morning (before breakfast)    SUMATRIPTAN (IMITREX) 50 MG TABLET    Take 1 tablet by mouth once as needed for migraine    URSODIOL (ACTIGALL) 300 MG CAPSULE    Take 300 mg by mouth 3 times daily (before meals)         ALLERGIES     Sporanox [itraconazole]    FAMILYHISTORY       Family History   Problem Relation Age of Onset    High Cholesterol Father     Hypertension Father     Other Father         vision loss    Colon Cancer Maternal Grandmother     Cancer Maternal Grandmother         colon cancer,pelvic cancer, lung cancer    Hypertension Maternal Grandmother         vision loss    Migraines Mother     Asthma Sister         vision loss    Hypertension Maternal Grandfather         vision loss    Heart Failure Maternal Grandfather     Rheum Arthritis Paternal Grandmother     Cancer Paternal Grandmother         lung caner    Diabetes Neg Hx     Emphysema Neg Hx           SOCIAL HISTORY       Social History     Tobacco Use    Smoking status: Never Smoker    Smokeless tobacco: Never Used   Substance Use Topics    Alcohol use: Yes     Alcohol/week: 2.0 standard drinks     Types: 1 Glasses of wine, 1 Shots of liquor per week     Frequency: 2-4 times a month     Drinks per session: 3 or 4     Binge frequency: Never     Comment: occassional    Drug use: No       SCREENINGS    [place] Coma Scale  Eye Opening: Spontaneous  Best Verbal Response: Oriented  Best Motor Response: Obeys commands  [place] Coma Scale Score: 15        PHYSICAL EXAM    (up to 7 for level 4, 8 or more for level 5)     ED Triage Vitals [04/23/21 1323]   BP Temp Temp Source Pulse Resp SpO2 Height Weight   (!) 149/83 98.3 °F (36.8 °C) Oral 80 20 100 % 5' 4\" (1.626 m) 133 lb (60.3 kg)       Physical Exam  Vitals signs and nursing note reviewed. Constitutional:       General: She is awake. Appearance: Normal appearance. She is well-developed and normal weight. HENT:      Head: Normocephalic and atraumatic. Nose: Nose normal.   Eyes:      General:         Right eye: No discharge. Left eye: No discharge. Neck:      Musculoskeletal: Normal range of motion. Cardiovascular:      Rate and Rhythm: Normal rate and regular rhythm. Heart sounds: Normal heart sounds. Pulmonary:      Effort: Pulmonary effort is normal. No respiratory distress. Breath sounds: Normal breath sounds. Abdominal:      General: Bowel sounds are normal.      Palpations: Abdomen is soft. Tenderness: There is abdominal tenderness in the left upper quadrant and left lower quadrant. There is no right CVA tenderness or left CVA tenderness. Musculoskeletal: Normal range of motion. Skin:     General: Skin is warm and dry.       Coloration: Skin is not pale.   Neurological:      Mental Status: She is alert and oriented to person, place, and time. Psychiatric:         Behavior: Behavior normal. Behavior is cooperative. DIAGNOSTIC RESULTS   LABS:    Labs Reviewed   CBC WITH AUTO DIFFERENTIAL - Abnormal; Notable for the following components:       Result Value    RDW 12.3 (*)     All other components within normal limits    Narrative:     Performed at:  [address], [facility] [employer]   Phone [phone]   COMPREHENSIVE METABOLIC PANEL - Abnormal; Notable for the following components:    Alkaline Phosphatase 168 (*)     All other components within normal limits    Narrative:     Performed at:  [place] [address] [employer]   Phone [phone]   LIPASE    Narrative:     Performed at:  [place] [address] [employer]   Phone [phone]   URINALYSIS    Narrative:     Performed at:  [address], [facility] [employer]   Phone [phone]       All other labs were within normal range or not returned as of this dictation. EKG: All EKG's are interpreted by the Emergency Department Physician in the absence of a cardiologist.  Please see their note for interpretation of EKG. RADIOLOGY:   Non-plain film images such as CT, Ultrasound and MRI are read by the radiologist. Plain radiographic images are visualized and preliminarily interpreted by the ED Provider with the below findings:        Interpretation per the Radiologist below, if available at the time of this note:    CT ABDOMEN PELVIS W IV CONTRAST Additional Contrast? None   Preliminary Result   No acute findings within the abdomen or pelvis. No results found.         PROCEDURES   Unless otherwise noted below, none     Procedures    CRITICAL CARE TIME N/A    CONSULTS:  None      EMERGENCY DEPARTMENT COURSE and DIFFERENTIAL DIAGNOSIS/MDM:   Vitals:    Vitals:    04/23/21 1323 04/23/21 1429 04/23/21 1539   BP: (!) 149/83 110/79 109/71   Pulse: 80 67 79   Resp: 20 18 16   Temp: 98.3 °F (36.8 °C)     TempSrc: Oral     SpO2: 100% 100% 100%   Weight: 133 lb (60.3 kg)     Height: 5' 4\" (1.626 m)         Patient was given the following medications:  Medications   0.9 % sodium chloride bolus (0 mLs Intravenous Stopped 4/23/21 1503)   iopamidol (ISOVUE-370) 76 % injection 75 mL (75 mLs Intravenous Given 4/23/21 1504)             Pertinent Labs & Imaging studies reviewed. (See chart for details)   -  Patient seen and evaluated in the emergency department. -  Triage and nursing notes reviewed and incorporated. -  Old chart records reviewed and incorporated. -  Patient case discussed with attending physician,  although Dr. Alejandro Mariscal was available for consultation  -  Differential diagnosis includes:  Gastritis, pancreatitis, cholecystitis, cholangitis, choledocholithiasis, hepatitis, cirrhosis, GERD, PUD, UTI, hydronephrosis, pyelonephritis, ulcerative colitis, colon cancer, diverticulitis, SBO, versus COVID-19  -  Work-up included:  See above CBC, CMP, lipase, UA, CT abdomen pelvis  -  ED treatment included:   Normal saline  - Consults: None  -  Results discussed with patient. Labs show  UA is unremarkable. CBC with RDW 12.3. Lipase 48. CMP with alk phos 168. Alk phos results do seem consistent with previous records as patient was been followed by GI and had been started on ursodiol and results of been trending down. Patient states that her GI doctor did order some outpatient labs and CT scan, although she is unsure when this was to be completed. Instructed her to call the office to be aware of the results and additional outpatient testing and treatment can be ordered at that time. Pt was given strict return precautions.  The patient is agreeable with plan of

## 2021-04-23 NOTE — ED NOTES
Discharge instructions were reviewed with the patient and to follow-up with GI, the patient verbalized understanding. Patient IV was removed with no complications. Patient stable and ambulatory upon discharge.        Nabeel Cardoso RN  04/23/21 8508

## 2021-04-26 ENCOUNTER — TELEPHONE (OUTPATIENT)
Dept: GASTROENTEROLOGY | Age: 36
End: 2021-04-26

## 2021-04-26 NOTE — TELEPHONE ENCOUNTER
----- Message from Warren Valdes MD sent at 4/23/2021  4:33 PM EDT -----  Please schedule EGD with MAC with me

## 2021-05-13 RX ORDER — FAMCICLOVIR 500 MG/1
TABLET, FILM COATED ORAL
Qty: 15 TABLET | Refills: 0 | Status: SHIPPED | OUTPATIENT
Start: 2021-05-13 | End: 2021-11-01 | Stop reason: SDUPTHER

## 2021-05-13 NOTE — TELEPHONE ENCOUNTER
Refilled medication per verbal order from provider.   Future appt not scheduled  Last appt 02/12/2021

## 2021-06-29 ENCOUNTER — TELEPHONE (OUTPATIENT)
Dept: FAMILY MEDICINE CLINIC | Age: 36
End: 2021-06-29

## 2021-06-29 RX ORDER — AMOXICILLIN 500 MG/1
1000 CAPSULE ORAL 2 TIMES DAILY
Qty: 40 CAPSULE | Refills: 0 | Status: SHIPPED | OUTPATIENT
Start: 2021-06-29 | End: 2021-07-09

## 2021-08-24 DIAGNOSIS — G43.009 MIGRAINE WITHOUT AURA AND WITHOUT STATUS MIGRAINOSUS, NOT INTRACTABLE: ICD-10-CM

## 2021-08-25 RX ORDER — SUMATRIPTAN 50 MG/1
TABLET, FILM COATED ORAL
Qty: 9 TABLET | Refills: 3 | Status: SHIPPED | OUTPATIENT
Start: 2021-08-25

## 2021-08-25 NOTE — TELEPHONE ENCOUNTER
Future appt scheduled 0 appt scheduled--Return in about 4 months (around 6/12/2021).                  Last appt 02/12/2021      Last Written  03/30/2020    SUMAtriptan (IMITREX) 50 MG tablet  #9 tablets  3 RF

## 2021-08-31 RX ORDER — ESOMEPRAZOLE MAGNESIUM 40 MG/1
CAPSULE, DELAYED RELEASE ORAL
Qty: 30 CAPSULE | Refills: 5 | Status: SHIPPED | OUTPATIENT
Start: 2021-08-31

## 2021-08-31 NOTE — TELEPHONE ENCOUNTER
Future appt scheduled 0 appt scheduled--Return in about 4 months (around 6/12/2021).                  Last appt 02/12/2021      Last Written 02/12/2021    esomeprazole (NEXIUM) 40 MG delayed release capsule  #30  5 RF

## 2021-11-01 ENCOUNTER — OFFICE VISIT (OUTPATIENT)
Dept: FAMILY MEDICINE CLINIC | Age: 36
End: 2021-11-01
Payer: MEDICARE

## 2021-11-01 VITALS
WEIGHT: 132 LBS | SYSTOLIC BLOOD PRESSURE: 115 MMHG | OXYGEN SATURATION: 98 % | HEART RATE: 72 BPM | BODY MASS INDEX: 22.66 KG/M2 | DIASTOLIC BLOOD PRESSURE: 76 MMHG

## 2021-11-01 DIAGNOSIS — K74.3 PRIMARY BILIARY CHOLANGITIS (HCC): Primary | ICD-10-CM

## 2021-11-01 DIAGNOSIS — M19.90 ARTHRITIS: ICD-10-CM

## 2021-11-01 DIAGNOSIS — R25.1 TREMOR: ICD-10-CM

## 2021-11-01 DIAGNOSIS — F32.0 MAJOR DEPRESSIVE DISORDER, SINGLE EPISODE, MILD (HCC): ICD-10-CM

## 2021-11-01 DIAGNOSIS — R10.84 GENERALIZED ABDOMINAL PAIN: ICD-10-CM

## 2021-11-01 DIAGNOSIS — M79.10 MYALGIA: ICD-10-CM

## 2021-11-01 PROCEDURE — 1036F TOBACCO NON-USER: CPT | Performed by: FAMILY MEDICINE

## 2021-11-01 PROCEDURE — G8427 DOCREV CUR MEDS BY ELIG CLIN: HCPCS | Performed by: FAMILY MEDICINE

## 2021-11-01 PROCEDURE — G8484 FLU IMMUNIZE NO ADMIN: HCPCS | Performed by: FAMILY MEDICINE

## 2021-11-01 PROCEDURE — 99214 OFFICE O/P EST MOD 30 MIN: CPT | Performed by: FAMILY MEDICINE

## 2021-11-01 PROCEDURE — G8420 CALC BMI NORM PARAMETERS: HCPCS | Performed by: FAMILY MEDICINE

## 2021-11-01 RX ORDER — FAMCICLOVIR 500 MG/1
TABLET, FILM COATED ORAL
Qty: 45 TABLET | Refills: 5 | Status: SHIPPED | OUTPATIENT
Start: 2021-11-01 | End: 2022-01-26

## 2021-11-01 NOTE — PROGRESS NOTES
Subjective:  Nessa Alberts is here to discuss the following issues. She has prior biliary cholangitis. She continues on Actigall. No related symptoms. No itching or icterus. She has depression and has titrated up Wellbutrin to 450 mg daily. After increasing the dose she has slowly noticed some potential effects. She has developed a mild tremor in her hands and has some diffuse myalgias and arthralgias. No joint redness swelling or warmth. She has intermittent generalized abdominal pain. This most often occurs in the left upper quadrant. No vomiting or diarrhea no fever sweats or chills this resulted in an emergency room visit a few months ago. Social History     Tobacco Use   Smoking Status Never Smoker   Smokeless Tobacco Never Used   Allergies:     Sporanox [itraconazole]    Objective:  /76   Pulse 72   Wt 132 lb (59.9 kg)   LMP 05/14/2015   SpO2 98%   BMI 22.66 kg/m²    No acute distress, heart regular rate and rhythm without murmur, lungs clear to auscultation easy effort, abdomen soft nondistended, no clubbing or cyanosis    Assessment:  1. Primary biliary cholangitis ([address]    2. Major depressive disorder, single episode, mild ([address]    3. Tremor    4. myalgia    5. Arthritis    6       generalized abdominal pain      Plan:  Decrease Wellbutrin to 300 mg daily  Continue to work with liver specialist  GI referral for abdominal pain and possible colonoscopy. She has a family history of colon cancer in her grandmother in her 35s  Future labs ordered. She will have these performed if all symptoms are not improving on decreased dose of Wellbutrin  Follow-up in 4 months or as needed  The diagnoses listed in the assessment above are stable unless otherwise indicated. Age-specific preventative health recommendations were reviewed and the [facility] [place]" was provided. Avoid exposure to tobacco products. Follow CDC guidelines for covid-19 prevention.   Read and consider all

## 2021-11-01 NOTE — PATIENT INSTRUCTIONS
Please read the healthy family handout that you were given and share it with your family. Please compare this printed medication list with your medications at home to be sure they are the same. If you have any medications that are different please contact us immediately at [phone]. Also review your allergies that we have listed, these may also include medications that you have not been able to tolerate, make sure everything listed is correct. If you have any allergies that are different please contact us immediately at [phone].

## 2021-12-20 RX ORDER — BUPROPION HYDROCHLORIDE 300 MG/1
TABLET ORAL
Qty: 90 TABLET | Refills: 3 | Status: SHIPPED | OUTPATIENT
Start: 2021-12-20

## 2021-12-20 NOTE — TELEPHONE ENCOUNTER
Future appt scheduled 0 appt scheduled-Return in about 6 months (around 5/1/2022). Last appt 11/01/2021      Last Written 04/09/2021    buPROPion (WELLBUTRIN XL) 300 MG extended release tablet  11/02/2021  #90  3 RF           Refilled medication per verbal order from provider.

## 2021-12-29 ENCOUNTER — TELEPHONE (OUTPATIENT)
Dept: FAMILY MEDICINE CLINIC | Age: 36
End: 2021-12-29

## 2021-12-29 DIAGNOSIS — Z20.822 CLOSE EXPOSURE TO COVID-19 VIRUS: ICD-10-CM

## 2021-12-29 DIAGNOSIS — R50.9 FEVER, UNSPECIFIED FEVER CAUSE: ICD-10-CM

## 2021-12-29 DIAGNOSIS — R05.9 COUGH: Primary | ICD-10-CM

## 2021-12-29 LAB
INTERNAL QC: NORMAL
SARS-COV-2, NAA: POSITIVE

## 2021-12-29 RX ORDER — AMOXICILLIN 500 MG/1
1000 CAPSULE ORAL 2 TIMES DAILY
Qty: 40 CAPSULE | Refills: 0 | Status: SHIPPED | OUTPATIENT
Start: 2021-12-29 | End: 2022-01-08

## 2021-12-29 NOTE — TELEPHONE ENCOUNTER
Noted.  Silke Farmer for Amoxil 500 mg 2 capsules twice daily for 10 days and please order Covid test at [facility] to be safe

## 2021-12-29 NOTE — TELEPHONE ENCOUNTER
Patient has had a cough for 3 weeks. Today she woke up with congestion and a slight fever. She wanted to know if a RX could be sent in for her and if she needed a covid test also. She is suppose to leave on 1/6 for a trip.

## 2022-01-01 NOTE — PROGRESS NOTES
Patient with complaints of urinary frequency, dysuria, hematuria and nausea over the past 2 days. Low risk for STI. Recommend Macrobid 100 mg twice daily x7 days. Encourage patient to drink plenty of fluids, take all doses of antibiotics and patient to follow-up in office if no better worsening of symptoms. (2) more than 100 beats/min

## 2022-01-26 RX ORDER — FAMCICLOVIR 500 MG/1
TABLET, FILM COATED ORAL
Qty: 15 TABLET | Refills: 5 | Status: SHIPPED | OUTPATIENT
Start: 2022-01-26 | End: 2022-09-15 | Stop reason: SDUPTHER

## 2022-01-26 NOTE — TELEPHONE ENCOUNTER
Future appt scheduled 0 appt scheduled-Return in about 6 months (around 5/1/2022). Last appt 11/01/2021      Last Written 11/01/2021    famciclovir (FAMVIR) 500 MG tablet  #45  5 RF       Refilled medication per verbal order from provider.

## 2022-09-15 DIAGNOSIS — J45.20 MILD INTERMITTENT ASTHMA WITHOUT COMPLICATION: ICD-10-CM

## 2022-09-16 RX ORDER — ALBUTEROL SULFATE 90 UG/1
2 AEROSOL, METERED RESPIRATORY (INHALATION) EVERY 6 HOURS PRN
Qty: 1 EACH | Refills: 1 | Status: SHIPPED | OUTPATIENT
Start: 2022-09-16

## 2022-09-16 RX ORDER — FAMCICLOVIR 500 MG/1
TABLET, FILM COATED ORAL
Qty: 15 TABLET | Refills: 5 | Status: SHIPPED | OUTPATIENT
Start: 2022-09-16

## 2022-10-21 RX ORDER — NITROFURANTOIN 25; 75 MG/1; MG/1
100 CAPSULE ORAL 2 TIMES DAILY
Qty: 20 CAPSULE | Refills: 0 | Status: SHIPPED | OUTPATIENT
Start: 2022-10-21 | End: 2022-10-31

## 2022-11-17 ENCOUNTER — OFFICE VISIT (OUTPATIENT)
Dept: FAMILY MEDICINE CLINIC | Age: 37
End: 2022-11-17
Payer: COMMERCIAL

## 2022-11-17 VITALS
HEART RATE: 64 BPM | SYSTOLIC BLOOD PRESSURE: 120 MMHG | TEMPERATURE: 98.4 F | DIASTOLIC BLOOD PRESSURE: 80 MMHG | WEIGHT: 134 LBS | OXYGEN SATURATION: 98 % | BODY MASS INDEX: 23 KG/M2

## 2022-11-17 DIAGNOSIS — G43.009 MIGRAINE WITHOUT AURA AND WITHOUT STATUS MIGRAINOSUS, NOT INTRACTABLE: ICD-10-CM

## 2022-11-17 DIAGNOSIS — J45.20 MILD INTERMITTENT ASTHMA WITHOUT COMPLICATION: ICD-10-CM

## 2022-11-17 DIAGNOSIS — F32.0 MAJOR DEPRESSIVE DISORDER, SINGLE EPISODE, MILD (HCC): ICD-10-CM

## 2022-11-17 DIAGNOSIS — R00.2 PALPITATIONS: ICD-10-CM

## 2022-11-17 DIAGNOSIS — K74.3 PRIMARY BILIARY CHOLANGITIS (HCC): Primary | ICD-10-CM

## 2022-11-17 PROCEDURE — 99214 OFFICE O/P EST MOD 30 MIN: CPT | Performed by: FAMILY MEDICINE

## 2022-11-17 RX ORDER — ASCORBIC ACID 500 MG
500 TABLET ORAL DAILY
COMMUNITY

## 2022-11-17 RX ORDER — FAMOTIDINE 10 MG
10 TABLET ORAL 2 TIMES DAILY
COMMUNITY

## 2022-11-17 RX ORDER — OBETICHOLIC ACID 5 MG/1
TABLET, FILM COATED ORAL DAILY
COMMUNITY

## 2022-11-17 ASSESSMENT — PATIENT HEALTH QUESTIONNAIRE - PHQ9
SUM OF ALL RESPONSES TO PHQ QUESTIONS 1-9: 3
SUM OF ALL RESPONSES TO PHQ QUESTIONS 1-9: 3
4. FEELING TIRED OR HAVING LITTLE ENERGY: 1
3. TROUBLE FALLING OR STAYING ASLEEP: 0
6. FEELING BAD ABOUT YOURSELF - OR THAT YOU ARE A FAILURE OR HAVE LET YOURSELF OR YOUR FAMILY DOWN: 0
9. THOUGHTS THAT YOU WOULD BE BETTER OFF DEAD, OR OF HURTING YOURSELF: 0
2. FEELING DOWN, DEPRESSED OR HOPELESS: 0
8. MOVING OR SPEAKING SO SLOWLY THAT OTHER PEOPLE COULD HAVE NOTICED. OR THE OPPOSITE, BEING SO FIGETY OR RESTLESS THAT YOU HAVE BEEN MOVING AROUND A LOT MORE THAN USUAL: 0
1. LITTLE INTEREST OR PLEASURE IN DOING THINGS: 0
SUM OF ALL RESPONSES TO PHQ QUESTIONS 1-9: 3
SUM OF ALL RESPONSES TO PHQ QUESTIONS 1-9: 3
10. IF YOU CHECKED OFF ANY PROBLEMS, HOW DIFFICULT HAVE THESE PROBLEMS MADE IT FOR YOU TO DO YOUR WORK, TAKE CARE OF THINGS AT HOME, OR GET ALONG WITH OTHER PEOPLE: 1
SUM OF ALL RESPONSES TO PHQ9 QUESTIONS 1 & 2: 0
7. TROUBLE CONCENTRATING ON THINGS, SUCH AS READING THE NEWSPAPER OR WATCHING TELEVISION: 2
5. POOR APPETITE OR OVEREATING: 0

## 2022-11-17 NOTE — PROGRESS NOTES
Subjective:  Trenton Al is here to discuss the following issues. She has rare biliary cholangitis and is working closely with GI. She is now on a combination of medications including longstanding Actigall. She also takes Pepcid. Her nausea has improved her lab work recently was improved. No related symptoms today. She has a history of depression and is on Wellbutrin and emotionally feels very well and this retrospectively has been very beneficial addition. She has migraine headaches but rarely requires Imitrex and it works well when needed. She has history of asthma and uses albuterol as needed and this allows her to be more physically active. No medicine side effects she occasionally has palpitations. He is occur a couple times a week and lasts only a few seconds. No related symptoms. She requests evaluation. No symptoms today. Social History     Tobacco Use   Smoking Status Never   Smokeless Tobacco Never   Allergies:     Sporanox [itraconazole]    Objective:  /80   Pulse 64   Temp 98.4 °F (36.9 °C) (Oral)   Wt 134 lb (60.8 kg)   LMP 05/14/2015   SpO2 98%   BMI 23.00 kg/m²    No acute distress, heart regular rate and rhythm without murmur, lungs clear to auscultation easy effort, abdomen soft nondistended, no clubbing or cyanosis mood happy affect reactive no edema    Assessment:  1. Primary biliary cholangitis (HCC)    2. Palpitations    3. Major depressive disorder, single episode, mild ([address]    4. Migraine without aura and without status migrainosus, not intractable    5. Mild intermittent asthma without complication            Plan:  Reviewed notes from her GI specialist  Continue current medications  She will have a CAT scan due to some lymphadenopathy as ordered by her gastroenterologist  48-hour Holter monitor  She plans on having endoscopy and colonoscopy  Follow-up in 6 months or as needed  The diagnoses listed in the assessment above are stable unless otherwise indicated. Age-specific preventative health recommendations were reviewed and a \"Healthy Family Handout\" has been provided. Avoid exposure to tobacco products. Follow COVID-19 CDC guidelines. Read and consider all information provided by the pharmacy regarding prescribed medications before use. Call or return for any problems that arise before the next scheduled appointment. Leeann Zamarripa MD    This note was transcribed using a voice recognition software system. Proper technique and careful oversight were used to increase transcription accuracy but inadvertent errors may be present.

## 2022-11-17 NOTE — PATIENT INSTRUCTIONS
Please read the healthy family handout that you were given and share it with your family. Please compare this printed medication list with your medications at home to be sure they are the same. If you have any medications that are different please contact us immediately at [phone]. Also review your allergies that we have listed, these may also include medications that you have not been able to tolerate, make sure everything listed is correct. If you have any allergies that are different please contact us immediately at [phone]. You may receive a survey in the mail or by email asking about your experience during your visit today. Please complete and return to us so we know how we are serving you.

## 2023-01-05 RX ORDER — BUPROPION HYDROCHLORIDE 300 MG/1
TABLET ORAL
Qty: 90 TABLET | Refills: 3 | Status: SHIPPED | OUTPATIENT
Start: 2023-01-05

## 2023-03-03 ENCOUNTER — TELEPHONE (OUTPATIENT)
Dept: FAMILY MEDICINE CLINIC | Age: 38
End: 2023-03-03

## 2023-03-03 RX ORDER — DOXYCYCLINE HYCLATE 100 MG
100 TABLET ORAL 2 TIMES DAILY
Qty: 20 TABLET | Refills: 0 | Status: SHIPPED | OUTPATIENT
Start: 2023-03-03 | End: 2023-03-13

## 2023-03-03 NOTE — TELEPHONE ENCOUNTER
Patient c/o cough and congestion x 2 weeks, drainage is yellow patient is requesting abx to be sent to [facility]

## 2024-01-21 SDOH — ECONOMIC STABILITY: HOUSING INSECURITY
IN THE LAST 12 MONTHS, WAS THERE A TIME WHEN YOU DID NOT HAVE A STEADY PLACE TO SLEEP OR SLEPT IN A SHELTER (INCLUDING NOW)?: NO

## 2024-01-21 SDOH — ECONOMIC STABILITY: FOOD INSECURITY: WITHIN THE PAST 12 MONTHS, YOU WORRIED THAT YOUR FOOD WOULD RUN OUT BEFORE YOU GOT MONEY TO BUY MORE.: NEVER TRUE

## 2024-01-21 SDOH — ECONOMIC STABILITY: INCOME INSECURITY: HOW HARD IS IT FOR YOU TO PAY FOR THE VERY BASICS LIKE FOOD, HOUSING, MEDICAL CARE, AND HEATING?: SOMEWHAT HARD

## 2024-01-21 SDOH — ECONOMIC STABILITY: TRANSPORTATION INSECURITY
IN THE PAST 12 MONTHS, HAS LACK OF TRANSPORTATION KEPT YOU FROM MEETINGS, WORK, OR FROM GETTING THINGS NEEDED FOR DAILY LIVING?: NO

## 2024-01-21 SDOH — ECONOMIC STABILITY: FOOD INSECURITY: WITHIN THE PAST 12 MONTHS, THE FOOD YOU BOUGHT JUST DIDN'T LAST AND YOU DIDN'T HAVE MONEY TO GET MORE.: SOMETIMES TRUE

## 2024-01-21 ASSESSMENT — PATIENT HEALTH QUESTIONNAIRE - PHQ9
5. POOR APPETITE OR OVEREATING: SEVERAL DAYS
SUM OF ALL RESPONSES TO PHQ9 QUESTIONS 1 & 2: 2
4. FEELING TIRED OR HAVING LITTLE ENERGY: NEARLY EVERY DAY
SUM OF ALL RESPONSES TO PHQ QUESTIONS 1-9: 8
10. IF YOU CHECKED OFF ANY PROBLEMS, HOW DIFFICULT HAVE THESE PROBLEMS MADE IT FOR YOU TO DO YOUR WORK, TAKE CARE OF THINGS AT HOME, OR GET ALONG WITH OTHER PEOPLE: 1
2. FEELING DOWN, DEPRESSED OR HOPELESS: SEVERAL DAYS
SUM OF ALL RESPONSES TO PHQ QUESTIONS 1-9: 8
3. TROUBLE FALLING OR STAYING ASLEEP: NOT AT ALL
5. POOR APPETITE OR OVEREATING: 1
1. LITTLE INTEREST OR PLEASURE IN DOING THINGS: 1
SUM OF ALL RESPONSES TO PHQ QUESTIONS 1-9: 8
1. LITTLE INTEREST OR PLEASURE IN DOING THINGS: SEVERAL DAYS
6. FEELING BAD ABOUT YOURSELF - OR THAT YOU ARE A FAILURE OR HAVE LET YOURSELF OR YOUR FAMILY DOWN: 0
9. THOUGHTS THAT YOU WOULD BE BETTER OFF DEAD, OR OF HURTING YOURSELF: NOT AT ALL
4. FEELING TIRED OR HAVING LITTLE ENERGY: 3
10. IF YOU CHECKED OFF ANY PROBLEMS, HOW DIFFICULT HAVE THESE PROBLEMS MADE IT FOR YOU TO DO YOUR WORK, TAKE CARE OF THINGS AT HOME, OR GET ALONG WITH OTHER PEOPLE: SOMEWHAT DIFFICULT
7. TROUBLE CONCENTRATING ON THINGS, SUCH AS READING THE NEWSPAPER OR WATCHING TELEVISION: 2
SUM OF ALL RESPONSES TO PHQ QUESTIONS 1-9: 8
8. MOVING OR SPEAKING SO SLOWLY THAT OTHER PEOPLE COULD HAVE NOTICED. OR THE OPPOSITE - BEING SO FIDGETY OR RESTLESS THAT YOU HAVE BEEN MOVING AROUND A LOT MORE THAN USUAL: NOT AT ALL
7. TROUBLE CONCENTRATING ON THINGS, SUCH AS READING THE NEWSPAPER OR WATCHING TELEVISION: MORE THAN HALF THE DAYS
SUM OF ALL RESPONSES TO PHQ QUESTIONS 1-9: 8
3. TROUBLE FALLING OR STAYING ASLEEP: 0
8. MOVING OR SPEAKING SO SLOWLY THAT OTHER PEOPLE COULD HAVE NOTICED. OR THE OPPOSITE, BEING SO FIGETY OR RESTLESS THAT YOU HAVE BEEN MOVING AROUND A LOT MORE THAN USUAL: 0
6. FEELING BAD ABOUT YOURSELF - OR THAT YOU ARE A FAILURE OR HAVE LET YOURSELF OR YOUR FAMILY DOWN: NOT AT ALL
9. THOUGHTS THAT YOU WOULD BE BETTER OFF DEAD, OR OF HURTING YOURSELF: 0
2. FEELING DOWN, DEPRESSED OR HOPELESS: 1

## 2024-01-24 ENCOUNTER — OFFICE VISIT (OUTPATIENT)
Dept: FAMILY MEDICINE CLINIC | Age: 39
End: 2024-01-24
Payer: COMMERCIAL

## 2024-01-24 VITALS
BODY MASS INDEX: 24.2 KG/M2 | SYSTOLIC BLOOD PRESSURE: 115 MMHG | HEART RATE: 65 BPM | TEMPERATURE: 97.2 F | WEIGHT: 141 LBS | OXYGEN SATURATION: 97 % | DIASTOLIC BLOOD PRESSURE: 73 MMHG

## 2024-01-24 DIAGNOSIS — E78.2 MIXED HYPERLIPIDEMIA: ICD-10-CM

## 2024-01-24 DIAGNOSIS — F32.0 MAJOR DEPRESSIVE DISORDER, SINGLE EPISODE, MILD (HCC): ICD-10-CM

## 2024-01-24 DIAGNOSIS — E55.9 VITAMIN D DEFICIENCY: ICD-10-CM

## 2024-01-24 DIAGNOSIS — R74.8 ABNORMAL LIVER ENZYMES: Primary | ICD-10-CM

## 2024-01-24 DIAGNOSIS — K74.3 PRIMARY BILIARY CHOLANGITIS (HCC): ICD-10-CM

## 2024-01-24 DIAGNOSIS — R53.83 FATIGUE, UNSPECIFIED TYPE: ICD-10-CM

## 2024-01-24 PROCEDURE — 99214 OFFICE O/P EST MOD 30 MIN: CPT | Performed by: FAMILY MEDICINE

## 2024-01-24 NOTE — PROGRESS NOTES
Subjective:  Kylie Joya is here to discuss the following issues.  She has a history of abnormal liver enzymes and primary biliary cholangitis and continues to work with her liver specialist and takes usual medication.  She has no nausea vomiting abdominal pain or other related symptoms and states most recent blood work was favorable.  On review of systems she has generalized fatigue.  She will have episodes of fatigue typically in the afternoon and a nap often resolves this quickly.  She states this may be related to depression.  No suicidal or homicidal thoughts.  She sleeps well.  No other related symptoms no chest pain palpitation fever sweats chills.  No respiratory symptoms.  She has a history of asthma but uses albuterol very rarely.  She is due for blood work.  She has vitamin D deficiency and continues on her supplement.  She has a history of marginal elevated cholesterol.  She is not fasting  Social History     Tobacco Use   Smoking Status Never   Smokeless Tobacco Never   Allergies:     Sporanox [itraconazole]    Objective:  /73   Pulse 65   Temp 97.2 °F (36.2 °C) (Temporal)   Wt 64 kg (141 lb)   LMP 05/14/2015   SpO2 97%   BMI 24.20 kg/m²    No acute distress, heart regular rate and rhythm without murmur, lungs clear to auscultation easy effort, abdomen nondistended, no clubbing or cyanosis mood happy affect reactive    Assessment:  1. Abnormal liver enzymes    2. Primary biliary cholangitis (HCC)    3. Fatigue, unspecified type    4. Vitamin D deficiency    5. Mixed hyperlipidemia    6.     Major depressive disorder        Plan:  She declines the need for prescription medication to improve her potential depression symptoms  Labs ordered  She states she will try herbal treatments for her mood and hopes that this will also help her daily energy level.  Again she has episodes about 3 times a week typically in the afternoon of some fatigue that is relieved with a short nap and has no

## 2024-01-24 NOTE — PATIENT INSTRUCTIONS
Please read the healthy family handout that you were given and share it with your family.       Please compare this printed medication list with your medications at home to be sure they are the same.  If you have any medications that are different please contact us immediately at [phone].     Also review your allergies that we have listed, these may also include medications that you have not been able to tolerate, make sure everything listed is correct. If you have any allergies that are different please contact us immediately at [phone].     You may receive a survey in the mail or by email asking about your experience during your visit today. Please complete and return to us so we know how we are serving you.     Goal Outcome Evaluation:         Interventions implemented as appropriate.

## 2024-01-25 LAB
ALT SERPL-CCNC: 31 U/L (ref 10–40)
ANION GAP SERPL CALCULATED.3IONS-SCNC: 10 MMOL/L (ref 3–16)
AST SERPL-CCNC: 24 U/L (ref 15–37)
BASOPHILS # BLD: 0 K/UL (ref 0–0.2)
BASOPHILS NFR BLD: 0.6 %
BUN SERPL-MCNC: 12 MG/DL (ref 7–20)
CALCIUM SERPL-MCNC: 9.2 MG/DL (ref 8.3–10.6)
CHLORIDE SERPL-SCNC: 103 MMOL/L (ref 99–110)
CHOLEST SERPL-MCNC: 216 MG/DL (ref 0–199)
CO2 SERPL-SCNC: 26 MMOL/L (ref 21–32)
CREAT SERPL-MCNC: 0.8 MG/DL (ref 0.6–1.1)
DEPRECATED RDW RBC AUTO: 12 % (ref 12.4–15.4)
EOSINOPHIL # BLD: 0.1 K/UL (ref 0–0.6)
EOSINOPHIL NFR BLD: 1.2 %
FERRITIN SERPL IA-MCNC: 125.4 NG/ML (ref 15–150)
FOLATE SERPL-MCNC: >20 NG/ML (ref 4.78–24.2)
GFR SERPLBLD CREATININE-BSD FMLA CKD-EPI: >60 ML/MIN/{1.73_M2}
GLUCOSE SERPL-MCNC: 119 MG/DL (ref 70–99)
HCT VFR BLD AUTO: 37.8 % (ref 36–48)
HDLC SERPL-MCNC: 65 MG/DL (ref 40–60)
HGB BLD-MCNC: 12.6 G/DL (ref 12–16)
IRON SERPL-MCNC: 73 UG/DL (ref 37–145)
LDLC SERPL CALC-MCNC: 126 MG/DL
LYMPHOCYTES # BLD: 2.2 K/UL (ref 1–5.1)
LYMPHOCYTES NFR BLD: 32.9 %
MCH RBC QN AUTO: 29.7 PG (ref 26–34)
MCHC RBC AUTO-ENTMCNC: 33.4 G/DL (ref 31–36)
MCV RBC AUTO: 88.7 FL (ref 80–100)
MONOCYTES # BLD: 0.4 K/UL (ref 0–1.3)
MONOCYTES NFR BLD: 5.5 %
NEUTROPHILS # BLD: 4 K/UL (ref 1.7–7.7)
NEUTROPHILS NFR BLD: 59.8 %
PLATELET # BLD AUTO: 306 K/UL (ref 135–450)
PMV BLD AUTO: 9.6 FL (ref 5–10.5)
POTASSIUM SERPL-SCNC: 4.1 MMOL/L (ref 3.5–5.1)
RBC # BLD AUTO: 4.26 M/UL (ref 4–5.2)
SODIUM SERPL-SCNC: 139 MMOL/L (ref 136–145)
TRIGL SERPL-MCNC: 127 MG/DL (ref 0–150)
TSH SERPL DL<=0.005 MIU/L-ACNC: 1.26 UIU/ML (ref 0.27–4.2)
VIT B12 SERPL-MCNC: 545 PG/ML (ref 211–911)
VLDLC SERPL CALC-MCNC: 25 MG/DL
WBC # BLD AUTO: 6.7 K/UL (ref 4–11)

## 2024-02-28 ENCOUNTER — OFFICE VISIT (OUTPATIENT)
Dept: FAMILY MEDICINE CLINIC | Age: 39
End: 2024-02-28
Payer: COMMERCIAL

## 2024-02-28 VITALS
WEIGHT: 141 LBS | HEIGHT: 61 IN | OXYGEN SATURATION: 98 % | SYSTOLIC BLOOD PRESSURE: 110 MMHG | TEMPERATURE: 100.3 F | DIASTOLIC BLOOD PRESSURE: 80 MMHG | HEART RATE: 105 BPM | BODY MASS INDEX: 26.62 KG/M2

## 2024-02-28 DIAGNOSIS — J10.1 INFLUENZA A: Primary | ICD-10-CM

## 2024-02-28 DIAGNOSIS — R68.89 FLU-LIKE SYMPTOMS: ICD-10-CM

## 2024-02-28 PROCEDURE — 99214 OFFICE O/P EST MOD 30 MIN: CPT | Performed by: NURSE PRACTITIONER

## 2024-02-28 RX ORDER — OSELTAMIVIR PHOSPHATE 75 MG/1
75 CAPSULE ORAL 2 TIMES DAILY
Qty: 10 CAPSULE | Refills: 0 | Status: SHIPPED | OUTPATIENT
Start: 2024-02-28 | End: 2024-03-04

## 2024-02-28 ASSESSMENT — ENCOUNTER SYMPTOMS
SHORTNESS OF BREATH: 0
ALLERGIC/IMMUNOLOGIC NEGATIVE: 1
CHEST TIGHTNESS: 0
COUGH: 1
GASTROINTESTINAL NEGATIVE: 1
EYES NEGATIVE: 1

## 2024-02-28 NOTE — PROGRESS NOTES
No rash.   Neurological:      General: No focal deficit present.      Mental Status: She is alert and oriented to person, place, and time. Mental status is at baseline.   Psychiatric:         Attention and Perception: Attention normal.         Mood and Affect: Mood normal.         Speech: Speech normal.         Behavior: Behavior normal. Behavior is cooperative.         Assessment/Plan:   1. Influenza A  Patient presents today with a 3-day history of fever, chills, myalgias, cough, congestion, postnasal drip, and headache.  Patient is negative for COVID-19 and influenza B however positive for influenza A.  Advised on supportive measures.  Patient would like to take Tamiflu.  Tamiflu sent to pharmacy of choice.  Encouraged to drink plenty of fluids, take medication as prescribed, may take over-the-counter acetaminophen or ibuprofen for fever/discomfort and to call with any further questions or concerns.  Patient verbalized understanding and agreeable to plan.  - oseltamivir (TAMIFLU) 75 MG capsule; Take 1 capsule by mouth 2 times daily for 5 days  Dispense: 10 capsule; Refill: 0    2. Flu-like symptoms  See #1

## 2024-05-27 NOTE — TELEPHONE ENCOUNTER
Patient started Friday night with cough, congestion, sore throat and now is having colored mucous.  She is wanting to know if you would call in RX for her [employer]

## 2024-06-12 DIAGNOSIS — Z12.31 SCREENING MAMMOGRAM FOR BREAST CANCER: Primary | ICD-10-CM

## 2024-07-17 DIAGNOSIS — R23.2 HOT FLASHES: ICD-10-CM

## 2024-07-17 DIAGNOSIS — R63.5 WEIGHT GAIN: ICD-10-CM

## 2024-07-17 DIAGNOSIS — R53.83 FATIGUE, UNSPECIFIED TYPE: Primary | ICD-10-CM

## 2024-08-17 LAB
TESTOSTERONE FREE PERCENT: 1.46
TESTOSTERONE FREE: 0.29
TESTOSTERONE TOTAL: 20

## 2024-09-09 DIAGNOSIS — R63.5 WEIGHT GAIN: ICD-10-CM

## 2024-09-09 DIAGNOSIS — R53.83 FATIGUE, UNSPECIFIED TYPE: ICD-10-CM

## 2024-09-09 DIAGNOSIS — R23.2 HOT FLASHES: ICD-10-CM

## 2024-10-24 ENCOUNTER — TELEPHONE (OUTPATIENT)
Dept: FAMILY MEDICINE CLINIC | Age: 39
End: 2024-10-24

## 2024-10-24 RX ORDER — TIZANIDINE 2 MG/1
2-4 TABLET ORAL 3 TIMES DAILY PRN
Qty: 60 TABLET | Refills: 3 | Status: SHIPPED | OUTPATIENT
Start: 2024-10-24

## 2024-10-24 NOTE — TELEPHONE ENCOUNTER
Patient is c/o muscle pain in upper back and shoulder.  She thinks she may have just slept wrong or something.  She was wanting to see if you would prescribe muscle relaxer for her to [facility] and wanted to know what is highest dose of ibuprofen you would recommend   Patient called back and said she did see chiropractor who confirmed it was a pulled muscle attachment on the thoracic spine

## 2025-01-14 DIAGNOSIS — Z12.31 SCREENING MAMMOGRAM FOR BREAST CANCER: ICD-10-CM

## 2025-01-28 ASSESSMENT — PATIENT HEALTH QUESTIONNAIRE - PHQ9
3. TROUBLE FALLING OR STAYING ASLEEP: MORE THAN HALF THE DAYS
SUM OF ALL RESPONSES TO PHQ QUESTIONS 1-9: 4
SUM OF ALL RESPONSES TO PHQ QUESTIONS 1-9: 4
6. FEELING BAD ABOUT YOURSELF - OR THAT YOU ARE A FAILURE OR HAVE LET YOURSELF OR YOUR FAMILY DOWN: NOT AT ALL
4. FEELING TIRED OR HAVING LITTLE ENERGY: SEVERAL DAYS
5. POOR APPETITE OR OVEREATING: NOT AT ALL
9. THOUGHTS THAT YOU WOULD BE BETTER OFF DEAD, OR OF HURTING YOURSELF: NOT AT ALL
SUM OF ALL RESPONSES TO PHQ9 QUESTIONS 1 & 2: 0
1. LITTLE INTEREST OR PLEASURE IN DOING THINGS: NOT AT ALL
8. MOVING OR SPEAKING SO SLOWLY THAT OTHER PEOPLE COULD HAVE NOTICED. OR THE OPPOSITE - BEING SO FIDGETY OR RESTLESS THAT YOU HAVE BEEN MOVING AROUND A LOT MORE THAN USUAL: NOT AT ALL
8. MOVING OR SPEAKING SO SLOWLY THAT OTHER PEOPLE COULD HAVE NOTICED. OR THE OPPOSITE, BEING SO FIGETY OR RESTLESS THAT YOU HAVE BEEN MOVING AROUND A LOT MORE THAN USUAL: NOT AT ALL
7. TROUBLE CONCENTRATING ON THINGS, SUCH AS READING THE NEWSPAPER OR WATCHING TELEVISION: SEVERAL DAYS
2. FEELING DOWN, DEPRESSED OR HOPELESS: NOT AT ALL
4. FEELING TIRED OR HAVING LITTLE ENERGY: SEVERAL DAYS
10. IF YOU CHECKED OFF ANY PROBLEMS, HOW DIFFICULT HAVE THESE PROBLEMS MADE IT FOR YOU TO DO YOUR WORK, TAKE CARE OF THINGS AT HOME, OR GET ALONG WITH OTHER PEOPLE: NOT DIFFICULT AT ALL
10. IF YOU CHECKED OFF ANY PROBLEMS, HOW DIFFICULT HAVE THESE PROBLEMS MADE IT FOR YOU TO DO YOUR WORK, TAKE CARE OF THINGS AT HOME, OR GET ALONG WITH OTHER PEOPLE: NOT DIFFICULT AT ALL
3. TROUBLE FALLING OR STAYING ASLEEP: MORE THAN HALF THE DAYS
9. THOUGHTS THAT YOU WOULD BE BETTER OFF DEAD, OR OF HURTING YOURSELF: NOT AT ALL
SUM OF ALL RESPONSES TO PHQ QUESTIONS 1-9: 4
1. LITTLE INTEREST OR PLEASURE IN DOING THINGS: NOT AT ALL
SUM OF ALL RESPONSES TO PHQ QUESTIONS 1-9: 4
2. FEELING DOWN, DEPRESSED OR HOPELESS: NOT AT ALL
7. TROUBLE CONCENTRATING ON THINGS, SUCH AS READING THE NEWSPAPER OR WATCHING TELEVISION: SEVERAL DAYS
SUM OF ALL RESPONSES TO PHQ QUESTIONS 1-9: 4
5. POOR APPETITE OR OVEREATING: NOT AT ALL
6. FEELING BAD ABOUT YOURSELF - OR THAT YOU ARE A FAILURE OR HAVE LET YOURSELF OR YOUR FAMILY DOWN: NOT AT ALL

## 2025-01-31 ENCOUNTER — TELEPHONE (OUTPATIENT)
Dept: FAMILY MEDICINE CLINIC | Age: 40
End: 2025-01-31

## 2025-01-31 ENCOUNTER — OFFICE VISIT (OUTPATIENT)
Dept: FAMILY MEDICINE CLINIC | Age: 40
End: 2025-01-31

## 2025-01-31 VITALS
OXYGEN SATURATION: 98 % | HEIGHT: 61 IN | WEIGHT: 148 LBS | TEMPERATURE: 97.8 F | BODY MASS INDEX: 27.94 KG/M2 | HEART RATE: 68 BPM | SYSTOLIC BLOOD PRESSURE: 106 MMHG | DIASTOLIC BLOOD PRESSURE: 72 MMHG

## 2025-01-31 DIAGNOSIS — R63.5 WEIGHT GAIN: Primary | ICD-10-CM

## 2025-01-31 DIAGNOSIS — E55.9 VITAMIN D DEFICIENCY: ICD-10-CM

## 2025-01-31 DIAGNOSIS — E78.2 MIXED HYPERLIPIDEMIA: ICD-10-CM

## 2025-01-31 DIAGNOSIS — Z79.899 MEDICATION MANAGEMENT: ICD-10-CM

## 2025-01-31 DIAGNOSIS — Z00.00 ANNUAL PHYSICAL EXAM: Primary | ICD-10-CM

## 2025-01-31 DIAGNOSIS — R63.5 WEIGHT GAIN: ICD-10-CM

## 2025-01-31 LAB
25(OH)D3 SERPL-MCNC: 58.1 NG/ML
ALBUMIN SERPL-MCNC: 4.5 G/DL (ref 3.4–5)
ALBUMIN/GLOB SERPL: 1.7 {RATIO} (ref 1.1–2.2)
ALP SERPL-CCNC: 142 U/L (ref 40–129)
ALT SERPL-CCNC: 43 U/L (ref 10–40)
ANION GAP SERPL CALCULATED.3IONS-SCNC: 11 MMOL/L (ref 3–16)
AST SERPL-CCNC: 30 U/L (ref 15–37)
BILIRUB SERPL-MCNC: 0.4 MG/DL (ref 0–1)
BUN SERPL-MCNC: 13 MG/DL (ref 7–20)
CALCIUM SERPL-MCNC: 9.4 MG/DL (ref 8.3–10.6)
CHLORIDE SERPL-SCNC: 105 MMOL/L (ref 99–110)
CHOLEST SERPL-MCNC: 258 MG/DL (ref 0–199)
CO2 SERPL-SCNC: 25 MMOL/L (ref 21–32)
CREAT SERPL-MCNC: 0.8 MG/DL (ref 0.6–1.1)
GFR SERPLBLD CREATININE-BSD FMLA CKD-EPI: >90 ML/MIN/{1.73_M2}
GLUCOSE SERPL-MCNC: 83 MG/DL (ref 70–99)
HDLC SERPL-MCNC: 69 MG/DL (ref 40–60)
LDLC SERPL CALC-MCNC: 176 MG/DL
POTASSIUM SERPL-SCNC: 4.2 MMOL/L (ref 3.5–5.1)
PROT SERPL-MCNC: 7.2 G/DL (ref 6.4–8.2)
SODIUM SERPL-SCNC: 141 MMOL/L (ref 136–145)
TRIGL SERPL-MCNC: 64 MG/DL (ref 0–150)
VLDLC SERPL CALC-MCNC: 13 MG/DL

## 2025-01-31 SDOH — ECONOMIC STABILITY: FOOD INSECURITY: WITHIN THE PAST 12 MONTHS, THE FOOD YOU BOUGHT JUST DIDN'T LAST AND YOU DIDN'T HAVE MONEY TO GET MORE.: NEVER TRUE

## 2025-01-31 SDOH — ECONOMIC STABILITY: FOOD INSECURITY: WITHIN THE PAST 12 MONTHS, YOU WORRIED THAT YOUR FOOD WOULD RUN OUT BEFORE YOU GOT MONEY TO BUY MORE.: NEVER TRUE

## 2025-01-31 ASSESSMENT — ENCOUNTER SYMPTOMS
RESPIRATORY NEGATIVE: 1
SHORTNESS OF BREATH: 0
GASTROINTESTINAL NEGATIVE: 1
EYES NEGATIVE: 1
ALLERGIC/IMMUNOLOGIC NEGATIVE: 1
CHEST TIGHTNESS: 0
COUGH: 0

## 2025-01-31 NOTE — PROGRESS NOTES
Well Adult Note  Name: Kylie Joya Today’s Date: 2025   MRN: [id number] Sex: Female   Age: 39 y.o. Ethnicity: Non- / Non    : 1985 Race: White (non-)      Kylie Joya is here for a well adult exam.          Assessment & Plan  Kylie was seen today for annual exam.    Diagnoses and all orders for this visit:    Annual physical exam    Vitamin D deficiency  -     Vitamin D 25 Hydroxy    Mixed hyperlipidemia  -     Comprehensive Metabolic Panel  -     Lipid Panel    Medication management  -     CBC with Auto Differential  -     Comprehensive Metabolic Panel         1. Health maintenance.  Her hot flashes may be attributed to a decrease in estrogen production following her hysterectomy. She has demonstrated commendable progress in her dietary habits and exercise regimen. She was advised to maintain a diet rich in protein and low in carbohydrates to facilitate weight loss. She was also encouraged to continue her daily exercise routine of 25 to 30 minutes. A comprehensive panel of fasting blood work, including cholesterol, CBC, kidney function, liver function, and vitamin D levels, will be conducted today.    2. Heart palpitations.  She reports occasional heart palpitations that sometimes cause a cough. These palpitations are not associated with chest pain or shortness of breath. She was reassured that occasional palpitations can be normal but advised to monitor for any associated symptoms such as chest pain or shortness of breath.    PROCEDURE  The patient has undergone a hysterectomy in the past.      Annual physical exam  Physical Exam  Vitals and nursing note reviewed.   Constitutional:       General: She is not in acute distress.     Appearance: Normal appearance. She is well-developed and well-groomed. She is not ill-appearing or toxic-appearing.   HENT:      Head: Normocephalic and atraumatic.      Right Ear: Tympanic membrane normal.      Left Ear: Tympanic membrane

## 2025-01-31 NOTE — TELEPHONE ENCOUNTER
Pt calling and states that she just got done at the gynecologist, and they suggested adding thyroid testing to her blood work due to her recent weight gain. Pt is asking if we can add it on to the blood work she had done today. Please advise

## 2025-02-01 LAB
BASOPHILS # BLD: 0.1 K/UL (ref 0–0.2)
BASOPHILS NFR BLD: 0.9 %
DEPRECATED RDW RBC AUTO: 12.8 % (ref 12.4–15.4)
EOSINOPHIL # BLD: 0.2 K/UL (ref 0–0.6)
EOSINOPHIL NFR BLD: 2.9 %
HCT VFR BLD AUTO: 40.6 % (ref 36–48)
HGB BLD-MCNC: 13.4 G/DL (ref 12–16)
LYMPHOCYTES # BLD: 2.1 K/UL (ref 1–5.1)
LYMPHOCYTES NFR BLD: 36.7 %
MCH RBC QN AUTO: 29.8 PG (ref 26–34)
MCHC RBC AUTO-ENTMCNC: 32.9 G/DL (ref 31–36)
MCV RBC AUTO: 90.4 FL (ref 80–100)
MONOCYTES # BLD: 0.5 K/UL (ref 0–1.3)
MONOCYTES NFR BLD: 8.4 %
NEUTROPHILS # BLD: 2.9 K/UL (ref 1.7–7.7)
NEUTROPHILS NFR BLD: 51.1 %
PLATELET # BLD AUTO: 300 K/UL (ref 135–450)
PMV BLD AUTO: 9.7 FL (ref 5–10.5)
RBC # BLD AUTO: 4.49 M/UL (ref 4–5.2)
TSH SERPL DL<=0.005 MIU/L-ACNC: 1.63 UIU/ML (ref 0.27–4.2)
WBC # BLD AUTO: 5.7 K/UL (ref 4–11)

## 2025-02-07 ENCOUNTER — HOSPITAL ENCOUNTER (OUTPATIENT)
Dept: MAMMOGRAPHY | Age: 40
Discharge: HOME | End: 2025-02-07
Payer: COMMERCIAL

## 2025-02-07 VITALS — HEIGHT: 61 IN | WEIGHT: 144 LBS | BODY MASS INDEX: 27.19 KG/M2

## 2025-02-07 DIAGNOSIS — Z12.31 SCREENING MAMMOGRAM FOR BREAST CANCER: ICD-10-CM

## 2025-02-07 PROCEDURE — 77063 BREAST TOMOSYNTHESIS BI: CPT

## 2025-05-16 ENCOUNTER — HOSPITAL ENCOUNTER (OUTPATIENT)
Dept: ENDOSCOPY | Age: 40
Discharge: HOME | End: 2025-05-16
Payer: COMMERCIAL

## 2025-05-16 PROCEDURE — 91200 LIVER ELASTOGRAPHY: CPT
